# Patient Record
Sex: FEMALE | Race: WHITE | NOT HISPANIC OR LATINO | Employment: OTHER | URBAN - METROPOLITAN AREA
[De-identification: names, ages, dates, MRNs, and addresses within clinical notes are randomized per-mention and may not be internally consistent; named-entity substitution may affect disease eponyms.]

---

## 2019-02-12 ENCOUNTER — HOSPITAL ENCOUNTER (OUTPATIENT)
Dept: RADIOLOGY | Facility: HOSPITAL | Age: 69
Discharge: HOME | End: 2019-02-12
Attending: INTERNAL MEDICINE
Payer: MEDICARE

## 2019-02-12 ENCOUNTER — OFFICE VISIT (OUTPATIENT)
Dept: INTERNAL MEDICINE | Facility: CLINIC | Age: 69
End: 2019-02-12
Payer: MEDICARE

## 2019-02-12 VITALS
OXYGEN SATURATION: 97 % | HEIGHT: 69 IN | HEART RATE: 68 BPM | BODY MASS INDEX: 19.03 KG/M2 | WEIGHT: 128.5 LBS | DIASTOLIC BLOOD PRESSURE: 96 MMHG | SYSTOLIC BLOOD PRESSURE: 142 MMHG

## 2019-02-12 DIAGNOSIS — I10 ESSENTIAL HYPERTENSION: ICD-10-CM

## 2019-02-12 DIAGNOSIS — M19.041 PRIMARY OSTEOARTHRITIS OF BOTH HANDS: ICD-10-CM

## 2019-02-12 DIAGNOSIS — Z00.00 ANNUAL PHYSICAL EXAM: ICD-10-CM

## 2019-02-12 DIAGNOSIS — M19.042 PRIMARY OSTEOARTHRITIS OF BOTH HANDS: ICD-10-CM

## 2019-02-12 DIAGNOSIS — Z23 NEEDS FLU SHOT: ICD-10-CM

## 2019-02-12 DIAGNOSIS — R09.89 LABILE BLOOD PRESSURE: ICD-10-CM

## 2019-02-12 DIAGNOSIS — Z23 NEED FOR SHINGLES VACCINE: ICD-10-CM

## 2019-02-12 DIAGNOSIS — Z80.3 FAMILY HISTORY OF BREAST CANCER: ICD-10-CM

## 2019-02-12 DIAGNOSIS — Z23 NEED FOR DIPHTHERIA-TETANUS-PERTUSSIS (TDAP) VACCINE: ICD-10-CM

## 2019-02-12 DIAGNOSIS — G43.109 MIGRAINE WITH AURA AND WITHOUT STATUS MIGRAINOSUS, NOT INTRACTABLE: ICD-10-CM

## 2019-02-12 DIAGNOSIS — Z23 NEED FOR VACCINATION AGAINST STREPTOCOCCUS PNEUMONIAE USING PNEUMOCOCCAL CONJUGATE VACCINE 13: ICD-10-CM

## 2019-02-12 DIAGNOSIS — Z00.00 ANNUAL PHYSICAL EXAM: Primary | ICD-10-CM

## 2019-02-12 PROCEDURE — 99999 PR PBB SHADOW E&M-NEW PATIENT-LVL IV: ICD-10-PCS | Mod: PBBFAC,,, | Performed by: INTERNAL MEDICINE

## 2019-02-12 PROCEDURE — 71046 X-RAY EXAM CHEST 2 VIEWS: CPT | Mod: 26,,, | Performed by: RADIOLOGY

## 2019-02-12 PROCEDURE — 71046 X-RAY EXAM CHEST 2 VIEWS: CPT | Mod: TC

## 2019-02-12 PROCEDURE — 99387 PR PREVENTIVE VISIT,NEW,65 & OVER: ICD-10-PCS | Mod: S$PBB,,, | Performed by: INTERNAL MEDICINE

## 2019-02-12 PROCEDURE — 99387 INIT PM E/M NEW PAT 65+ YRS: CPT | Mod: S$PBB,,, | Performed by: INTERNAL MEDICINE

## 2019-02-12 PROCEDURE — 99999 PR PBB SHADOW E&M-NEW PATIENT-LVL IV: CPT | Mod: PBBFAC,,, | Performed by: INTERNAL MEDICINE

## 2019-02-12 PROCEDURE — 99204 OFFICE O/P NEW MOD 45 MIN: CPT | Mod: PBBFAC,25 | Performed by: INTERNAL MEDICINE

## 2019-02-12 PROCEDURE — 71046 XR CHEST PA AND LATERAL: ICD-10-PCS | Mod: 26,,, | Performed by: RADIOLOGY

## 2019-02-12 RX ORDER — LOSARTAN POTASSIUM 50 MG/1
50 TABLET ORAL DAILY
Qty: 90 TABLET | Refills: 3 | Status: SHIPPED | OUTPATIENT
Start: 2019-02-12 | End: 2020-02-06

## 2019-02-12 NOTE — PATIENT INSTRUCTIONS
Eating Heart-Healthy Food: Using the DASH Plan    Eating for your heart doesnt have to be hard or boring. You just need to know how to make healthier choices. The DASH eating plan has been developed to help you do just that. DASH stands for Dietary Approaches to Stop Hypertension. It is a plan that has been proven to be healthier for your heart and to lower your risk for high blood pressure. It can also help lower your risk for cancer, heart disease, osteoporosis, and diabetes.  Choosing from each food group  Choose foods from each of the food groups below each day. Try to get the recommended number of servings for each food group. The serving numbers are based on a diet of 2,000 calories a day. Talk to your doctor if youre unsure about your calorie needs. Along with getting the correct servings, the DASH plan also recommends a sodium intake less than 2,300 mg per day.        Grains  Servings: 6 to 8 a day  A serving is:  · 1 slice bread  · 1 ounce dry cereal  · Half a cup cooked rice, pasta or cereal  Best choices: Whole grains and any grains high in fiber. Vegetables  Servings: 4 to 5 a day  A serving is:  · 1 cup raw leafy vegetable  · Half a cup cut-up raw or cooked vegetable  · Half a cup vegetable juice  Best choices: Fresh or frozen vegetables prepared without added salt or fat.   Fruits  Servings: 4 to 5 a day  A serving is:  · 1 medium fruit  · One-quarter cup dried fruit  · Half a cup fresh, frozen, or canned fruit  · Half a cup of 100% fruit juices  Best choices: A variety of fresh fruits of different colors. Whole fruits are a better choice than fruit juices. Low-fat or fat-free dairy  Servings: 2 to 3 a day  A serving is:  · 1 cup milk  · 1 cup yogurt  · One and a half ounces cheese  Best choices: Skim or 1% milk, low-fat or fat-free yogurt or buttermilk, and low-fat cheeses.         Lean meats, poultry, fish  Servings: 6 or fewer a day  A serving is:  · 1 ounce cooked meats, poultry, or fish  · 1  egg  Best choices: Lean poultry and fish. Trim away visible fat. Broil, grill, roast, or boil instead of frying. Remove skin from poultry before eating. Limit how much red meat you eat.  Nuts, seeds, beans  Servings: 4 to 5 a week  A serving is:  · One-third cup nuts (one and a half ounces)  · 2 tablespoons nut butter or seeds  · Half a cup cooked dry beans or legumes  Best choices: Dry roasted nuts with no salt added, lentils, kidney beans, garbanzo beans, and whole moseley beans.   Fats and oils  Servings: 2 to 3 a day  A serving is:  · 1 teaspoon vegetable oil  · 1 teaspoon soft margarine  · 1 tablespoon mayonnaise  · 2 tablespoons salad dressing  Best choices: Nut and vegetable oils (nontropical vegetable oils), such as olive and canola oil. Sweets  Servings: 5 a week or fewer  A serving is:  · 1 tablespoon sugar, maple syrup, or honey  · 1 tablespoon jam or jelly  · 1 half-ounce jelly beans (about 15)  · 1 cup lemonade  Best choices: Dried fruit can be a satisfying sweet. Choose low-fat sweets. And watch your serving sizes!      For more on the DASH eating plan, visit:  www.nhlbi.nih.gov/health/health-topics/topics/dash   Date Last Reviewed: 6/1/2016  © 8824-8993 [employer]. [address] [place] [address], [place] [address]. All rights reserved. This information is not intended as a substitute for professional medical care. Always follow your healthcare professional's instructions.        Tips for Using Less Salt    Most people with heart problems need to eat less salt (sodium). Reducing the amount of salt you eat may help control your blood pressure. The higher your blood pressure, the greater your risk for heart disease, stroke, blindness, and kidney problems.  At the store  · Make low-salt choices by reading labels carefully. Look for the total amount of sodium per serving.  · Use more fresh food. Buy more fruits and vegetables. Select lean meats, fish, and poultry.  · Use fewer frozen, canned, and  packaged foods which often contain a lot of sodium.  · Use plain frozen vegetables without sauces or toppings. These products are often low- or no-sodium.  · Opt for reduced-sodium or no-salt-added versions of canned vegetables and soups.  In the kitchen  · Don't add salt to food when you're cooking. Season with flavorings such as onion, garlic, pepper, salt-free herbal blends, and lemon or lime juice.  · Use a cookbook containing low-salt recipes. It can give you ideas for tasty meals that are healthy for your heart.  · Sprinkle salt-free herbal blends on vegetables and meat.  · Drain and rinse canned foods, such as canned beans and vegetables, before cooking or eating.  Eating out  · Tell the  you're on a low-salt diet. Ask questions about the menu.  · Order fish, chicken, and meat broiled, baked, poached, or grilled without salt, butter, or breading.  · Use lemon, pepper, and salt-free herb mixes to add flavor.  · Choose plain steamed rice, boiled noodles, and baked or boiled potatoes. Top potatoes with chives and a little sour cream.     Beware! Salt goes by many other names. Limit foods with these words listed as ingredients: salt, sodium, soy sauce, baking soda, baking powder, MSG, monosodium, Na (the chemical symbol for sodium). Some antacids are also high in salt.   Date Last Reviewed: 6/19/2015 © 2000-2017 [employer]. [address] [place] [address]. All rights reserved. This information is not intended as a substitute for professional medical care. Always follow your healthcare professional's instructions.        Low-Salt Choices  Eating salt (sodium) can make your body retain too much water. Excess water makes your heart work harder. Canned, packaged, and frozen foods are easy to prepare, but they are often high in sodium. Here are some ideas for low-salt foods you can easily prepare yourself.    For breakfast  · Fruit or 100% fruit juice  · Whole-wheat bread or an English  muffin. Compare sodium content on labels.  · Low-fat milk or yogurt  · Unsalted eggs  · Shredded wheat  · Corn tortillas  · Unsalted steamed rice  · Regular (not instant) hot cereal, made without salt  Stay away from:  · Sausage, [place], and ham  · Flour tortillas  · Packaged muffins, pancakes, and biscuits  · Instant hot cereals  · Cottage cheese  For lunch and dinner  · Fresh fish, chicken, turkey, or meat--baked, broiled, or roasted without salt  · Dry beans, cooked without salt  · Tofu, stir-fried without salt  · Unsalted fresh fruit and vegetables, or frozen or canned fruit and vegetables with no added salt  Stay away from:  · Lunch or deli meat that is cured or smoked  · Cheese  · Tomato juice and catsup  · Canned vegetables, soups, and fish not labeled as no-salt-added or reduced sodium  · Packaged gravies and sauces  · Olives, pickles, and relish  · Bottled salad dressings  For snacks and desserts  · Yogurt  · Unsalted, air popped popcorn  · Unsalted nuts or seeds  Stay away from:  · Pies and cakes  · Packaged dessert mixes  · Pizza  · Canned and packaged puddings  · Pretzels, chips, crackers, and nuts--unless the label says unsalted  Date Last Reviewed: 6/17/2015  © 2243-6743 [employer]. [address] [place] [address], [place] [address]. All rights reserved. This information is not intended as a substitute for professional medical care. Always follow your healthcare professional's instructions.      Goal  or less on top and 80 or less on the bottom

## 2019-02-14 ENCOUNTER — HOSPITAL ENCOUNTER (OUTPATIENT)
Dept: CARDIOLOGY | Facility: CLINIC | Age: 69
Discharge: HOME | End: 2019-02-14
Payer: MEDICARE

## 2019-02-14 DIAGNOSIS — Z00.00 ANNUAL PHYSICAL EXAM: ICD-10-CM

## 2019-02-14 DIAGNOSIS — I10 ESSENTIAL HYPERTENSION: ICD-10-CM

## 2019-02-14 PROCEDURE — 93000 EKG 12-LEAD: ICD-10-PCS | Mod: S$GLB,,, | Performed by: INTERNAL MEDICINE

## 2019-02-14 PROCEDURE — 93000 ELECTROCARDIOGRAM COMPLETE: CPT | Mod: S$GLB,,, | Performed by: INTERNAL MEDICINE

## 2019-02-14 PROCEDURE — 93005 ELECTROCARDIOGRAM TRACING: CPT | Mod: PBBFAC | Performed by: INTERNAL MEDICINE

## 2019-02-14 NOTE — PROGRESS NOTES
Subjective:       Patient ID: Stephanie Perez is a 68 y.o. female.    Chief Complaint: Annual Exam   Wellness visit    She is a very pleasant woman.  She is active but has not been playing as much tennis lately.  She watches her weight carefully and typically only eats once daily, a large salad usually.    She has never had an internists.  For the past couple of years her gynecologist has asked her to establish with an internist because her blood pressure has been running consistently high.  At home she is often getting about 150 over 95.  HPI  Review of Systems    Objective:      Physical Exam    Assessment:       1. Annual physical exam    2. Essential hypertension    3. Primary osteoarthritis of both hands    4. Migraine with aura and without status migrainosus, not intractable    5. Family history of breast cancer, one sister, paternal grandmother    6. Need for diphtheria-tetanus-pertussis (Tdap) vaccine    7. Needs flu shot    8. Need for vaccination against Streptococcus pneumoniae using pneumococcal conjugate vaccine 13    9. Need for shingles vaccine        Plan:   Stephanie was seen today for annual exam.    Diagnoses and all orders for this visit:    Annual physical exam  -     EKG 12-lead; Future  -     X-Ray Chest PA And Lateral; Future  -     CBC auto differential; Future  -     Comprehensive metabolic panel; Future  -     Urinalysis  -     Uric acid; Future  -     Lipid panel; Future    Essential hypertension.  Goal blood pressure is 130/80 or less consistently.  Dash diet recommended.  Reading labels and introducing low salt foods.  -     EKG 12-lead; Future  -     X-Ray Chest PA And Lateral; Future  -     CBC auto differential; Future  -     Comprehensive metabolic panel; Future  -     Urinalysis  -     Uric acid; Future  -     Lipid panel; Future    Primary osteoarthritis of both hands    Migraine with aura and without status migrainosus, not intractable    Family history of breast cancer, one sister,  paternal grandmother    Need for diphtheria-tetanus-pertussis (Tdap) vaccine    Needs flu shot    Need for vaccination against Streptococcus pneumoniae using pneumococcal conjugate vaccine 13    Need for shingles vaccine    Other orders  -     losartan (COZAAR) 50 MG tablet; Take 1 tablet (50 mg total) by mouth once daily. Blood pressure    Follow-up in about 3 months (around 5/12/2019) for also one year physical.      Addendum:  08-  This visit is being recoded for labile blood pressure.  No history of hypertension.

## 2019-05-23 ENCOUNTER — OFFICE VISIT (OUTPATIENT)
Dept: INTERNAL MEDICINE | Facility: CLINIC | Age: 69
End: 2019-05-23
Payer: MEDICARE

## 2019-05-23 VITALS
SYSTOLIC BLOOD PRESSURE: 164 MMHG | HEART RATE: 61 BPM | HEIGHT: 69 IN | OXYGEN SATURATION: 99 % | WEIGHT: 128.06 LBS | BODY MASS INDEX: 18.97 KG/M2 | DIASTOLIC BLOOD PRESSURE: 90 MMHG

## 2019-05-23 DIAGNOSIS — Z12.11 SCREEN FOR COLON CANCER: ICD-10-CM

## 2019-05-23 DIAGNOSIS — I10 ESSENTIAL HYPERTENSION: Primary | ICD-10-CM

## 2019-05-23 PROCEDURE — 99213 PR OFFICE/OUTPT VISIT, EST, LEVL III, 20-29 MIN: ICD-10-PCS | Mod: S$PBB,,, | Performed by: INTERNAL MEDICINE

## 2019-05-23 PROCEDURE — 99999 PR PBB SHADOW E&M-EST. PATIENT-LVL IV: ICD-10-PCS | Mod: PBBFAC,,, | Performed by: INTERNAL MEDICINE

## 2019-05-23 PROCEDURE — 99999 PR PBB SHADOW E&M-EST. PATIENT-LVL IV: CPT | Mod: PBBFAC,,, | Performed by: INTERNAL MEDICINE

## 2019-05-23 PROCEDURE — 99214 OFFICE O/P EST MOD 30 MIN: CPT | Mod: PBBFAC | Performed by: INTERNAL MEDICINE

## 2019-05-23 PROCEDURE — 99213 OFFICE O/P EST LOW 20 MIN: CPT | Mod: S$PBB,,, | Performed by: INTERNAL MEDICINE

## 2019-05-23 RX ORDER — ESTRADIOL 0.5 MG/1
0.5 TABLET ORAL DAILY
Refills: 4 | COMMUNITY
Start: 2019-03-26 | End: 2023-01-18

## 2019-05-23 RX ORDER — MEDROXYPROGESTERONE ACETATE 2.5 MG/1
2.5 TABLET ORAL DAILY
Refills: 3 | COMMUNITY
Start: 2019-03-23 | End: 2023-01-18

## 2019-05-23 NOTE — PROGRESS NOTES
Subjective:       Patient ID: Stephanie Perez is a 69 y.o. female.    Chief Complaint: Hypertension (f/u) and Follow-up (3 month )   Follow-up treatment of essential hypertension newly diagnosed and preop cataract surgery with Dr. Javier Briceno.    The patient is ready for cataract surgery and looking forward to this.  She is clear for surgery.    Her blood pressures at home have been running between about 135 and 145 systolic over 70-75.    She has no symptoms of hypertension.    Her previous laboratory work, chest x-ray and EKG are reviewed and are normal.    Screenings and immunizations are discussed.  She will consider doing a colonoscopy.  She has never had a colonoscopy.  Her   of colon cancer.  HPI  Review of Systems   Constitutional: Negative for activity change and unexpected weight change.   HENT: Negative for hearing loss, rhinorrhea and trouble swallowing.    Eyes: Negative for discharge and visual disturbance.   Respiratory: Negative for chest tightness and wheezing.    Cardiovascular: Negative for chest pain and palpitations.   Gastrointestinal: Negative for blood in stool, constipation, diarrhea and vomiting.   Endocrine: Negative for polydipsia and polyuria.   Genitourinary: Negative for difficulty urinating, dysuria, hematuria and menstrual problem.   Musculoskeletal: Negative for arthralgias, joint swelling and neck pain.   Neurological: Negative for weakness and headaches.   Psychiatric/Behavioral: Negative for confusion and dysphoric mood.       Objective:      Physical Exam   Constitutional: She appears well-nourished.   HENT:   Head: Atraumatic.   Eyes: Conjunctivae are normal. No scleral icterus.   Neck: Neck supple.   Cardiovascular: Normal rate and regular rhythm.   Pulmonary/Chest: Effort normal and breath sounds normal.   Abdominal: Soft. There is no tenderness.   Musculoskeletal: She exhibits no edema.   Lymphadenopathy:     She has no cervical adenopathy.   Neurological: She is  alert.   Skin: Skin is warm and dry.   Psychiatric: She has a normal mood and affect. Her behavior is normal.   Vitals reviewed.      Assessment:       1. Essential hypertension    2. Screen for colon cancer        Plan:   Stephanie was seen today for hypertension and follow-up.    Diagnoses and all orders for this visit:    Essential hypertension    Screen for colon cancer  -     Case request GI: COLONOSCOPY  Medication List with Changes/Refills   Current Medications    ESTRADIOL (ESTRACE) 0.5 MG TABLET    Take 0.5 mg by mouth once daily.    LOSARTAN (COZAAR) 50 MG TABLET    Take 1 tablet (50 mg total) by mouth once daily. Blood pressure    MEDROXYPROGESTERONE (PROVERA) 2.5 MG TABLET    Take 2.5 mg by mouth once daily.       Follow up in about 3 months (around 8/23/2019).

## 2019-08-07 ENCOUNTER — OFFICE VISIT (OUTPATIENT)
Dept: INTERNAL MEDICINE | Facility: CLINIC | Age: 69
End: 2019-08-07
Payer: MEDICARE

## 2019-08-07 VITALS
WEIGHT: 128.31 LBS | HEIGHT: 68 IN | OXYGEN SATURATION: 97 % | HEART RATE: 102 BPM | SYSTOLIC BLOOD PRESSURE: 126 MMHG | BODY MASS INDEX: 19.45 KG/M2 | DIASTOLIC BLOOD PRESSURE: 82 MMHG

## 2019-08-07 DIAGNOSIS — I10 ESSENTIAL HYPERTENSION: Primary | ICD-10-CM

## 2019-08-07 PROBLEM — R09.89 LABILE BLOOD PRESSURE: Status: ACTIVE | Noted: 2019-08-07

## 2019-08-07 PROCEDURE — 99999 PR PBB SHADOW E&M-EST. PATIENT-LVL III: CPT | Mod: PBBFAC,,, | Performed by: INTERNAL MEDICINE

## 2019-08-07 PROCEDURE — 99999 PR PBB SHADOW E&M-EST. PATIENT-LVL III: ICD-10-PCS | Mod: PBBFAC,,, | Performed by: INTERNAL MEDICINE

## 2019-08-07 PROCEDURE — 99213 OFFICE O/P EST LOW 20 MIN: CPT | Mod: S$PBB,,, | Performed by: INTERNAL MEDICINE

## 2019-08-07 PROCEDURE — 99213 OFFICE O/P EST LOW 20 MIN: CPT | Mod: PBBFAC | Performed by: INTERNAL MEDICINE

## 2019-08-07 PROCEDURE — 99213 PR OFFICE/OUTPT VISIT, EST, LEVL III, 20-29 MIN: ICD-10-PCS | Mod: S$PBB,,, | Performed by: INTERNAL MEDICINE

## 2019-08-09 NOTE — PROGRESS NOTES
Subjective:       Patient ID: Stephanie Perez is a 69 y.o. female.    Chief Complaint: Follow-up (BP f/u )   Follow-up blood pressure  This nice lady's doing very well  She brings a record of her blood pressures  HPI  Review of Systems   Respiratory: Negative for shortness of breath.    Cardiovascular: Negative for chest pain and palpitations.   Musculoskeletal: Negative for neck pain.   Neurological: Negative for headaches.       Objective:      Physical Exam   Constitutional: She appears well-nourished.   HENT:   Head: Atraumatic.   Eyes: Conjunctivae are normal. No scleral icterus.   Neck: Neck supple.   Cardiovascular: Normal rate and regular rhythm.   Pulmonary/Chest: Effort normal and breath sounds normal.   Abdominal: Soft. There is no tenderness.   Musculoskeletal: She exhibits no edema.   Lymphadenopathy:     She has no cervical adenopathy.   Neurological: She is alert.   Skin: Skin is warm and dry.   Psychiatric: She has a normal mood and affect. Her behavior is normal.       Assessment:       1. Essential hypertension        Plan:   Stephanie was seen today for follow-up.    Diagnoses and all orders for this visit:    Essential hypertension  Medication List with Changes/Refills   Current Medications    ESTRADIOL (ESTRACE) 0.5 MG TABLET    Take 0.5 mg by mouth once daily.    LOSARTAN (COZAAR) 50 MG TABLET    Take 1 tablet (50 mg total) by mouth once daily. Blood pressure    MEDROXYPROGESTERONE (PROVERA) 2.5 MG TABLET    Take 2.5 mg by mouth once daily.     She is going to continue to monitor her blood pressure, her diet and follow-up.

## 2019-11-18 DIAGNOSIS — Z12.11 COLON CANCER SCREENING: ICD-10-CM

## 2020-02-06 PROBLEM — I10 ESSENTIAL HYPERTENSION: Status: ACTIVE | Noted: 2020-02-06

## 2020-02-06 PROBLEM — E78.5 HYPERLIPIDEMIA: Status: ACTIVE | Noted: 2020-02-06

## 2020-02-13 ENCOUNTER — LAB VISIT (OUTPATIENT)
Dept: LAB | Facility: HOSPITAL | Age: 70
End: 2020-02-13
Attending: INTERNAL MEDICINE
Payer: COMMERCIAL

## 2020-02-13 DIAGNOSIS — Z12.11 COLON CANCER SCREENING: ICD-10-CM

## 2020-02-13 PROCEDURE — 82274 ASSAY TEST FOR BLOOD FECAL: CPT

## 2020-02-19 ENCOUNTER — PATIENT OUTREACH (OUTPATIENT)
Dept: ADMINISTRATIVE | Facility: HOSPITAL | Age: 70
End: 2020-02-19

## 2020-02-19 LAB — HEMOCCULT STL QL IA: NEGATIVE

## 2020-04-23 ENCOUNTER — PATIENT MESSAGE (OUTPATIENT)
Dept: ADMINISTRATIVE | Facility: OTHER | Age: 70
End: 2020-04-23

## 2020-05-07 RX ORDER — LOSARTAN POTASSIUM 50 MG/1
50 TABLET ORAL DAILY
Qty: 90 TABLET | Refills: 0 | Status: SHIPPED | OUTPATIENT
Start: 2020-05-07 | End: 2020-06-11 | Stop reason: SDUPTHER

## 2020-05-07 NOTE — PROGRESS NOTES
Refill Authorization Note    is requesting a refill authorization.    Brief assessment and rationale for refill: APPROVE: prr          Medication Therapy Plan: FLOS; FOVS; Approve 3 more    Medication reconciliation completed: No                         Comments:      Requested Prescriptions   Signed Prescriptions Disp Refills    losartan (COZAAR) 50 MG tablet 90 tablet 0     Sig: Take 1 tablet (50 mg total) by mouth once daily. Blood pressure       Cardiovascular:  Angiotensin Receptor Blockers Failed - 5/7/2020  3:50 PM        Failed - Cr is 1.3 or below and within 360 days     Creatinine   Date Value Ref Range Status   02/12/2019 0.7 0.5 - 1.4 mg/dL Final              Failed - K in normal range and within 360 days     Potassium   Date Value Ref Range Status   02/12/2019 4.2 3.5 - 5.1 mmol/L Final              Failed - eGFR within 360 days     eGFR if non    Date Value Ref Range Status   02/12/2019 >60.0 >60 mL/min/1.73 m^2 Final     Comment:     Calculation used to obtain the estimated glomerular filtration  rate (eGFR) is the CKD-EPI equation.        eGFR if    Date Value Ref Range Status   02/12/2019 >60.0 >60 mL/min/1.73 m^2 Final              Passed - Patient is at least 18 years old        Passed - Last BP in normal range within 360 days.     BP Readings from Last 3 Encounters:   08/07/19 126/82   05/23/19 (!) 164/90   02/12/19 (!) 142/96              Passed - Office visit in past 12 months or future 90 days.     Recent Outpatient Visits            9 months ago Essential hypertension    [facility] Danita Cornell MD    11 months ago Essential hypertension    [facility] Dan[facility] Cornell MD    1 year ago Annual physical exam    [facility] Danita Cornell MD          Future Appointments              In 4 weeks LAB, APPOINTMENT [facility], [facility]    In 1 month Danita SALOMON  MD [facility]                 Appointments  past 12m or future 3m with PCP    Date Provider   Last Visit   8/7/2019 Danita Cornell MD   Next Visit   6/11/2020 Danita Cornell MD   ED visits in past 90 days: 0     Note composed:4:14 PM 05/07/2020

## 2020-05-27 ENCOUNTER — PATIENT OUTREACH (OUTPATIENT)
Dept: OTHER | Facility: OTHER | Age: 70
End: 2020-05-27

## 2020-05-27 DIAGNOSIS — I10 ESSENTIAL HYPERTENSION: Primary | ICD-10-CM

## 2020-05-28 ENCOUNTER — PATIENT OUTREACH (OUTPATIENT)
Dept: OTHER | Facility: OTHER | Age: 70
End: 2020-05-28

## 2020-05-28 NOTE — LETTER
Barbara 3, 2020     Stephanie Perez  [address] [address]       Dear Stephanie,    Welcome to [facility]! Our goal is to make care effective, proactive and convenient by using data you send us from home to better treat your chronic conditions.              My name is Cindy DaoNamrtaa, and I am your dedicated Digital Medicine clinician. As an expert in medication management, I will help ensure that the medications you are taking continue to provide the intended benefits and help you reach your goals. You can reach me directly at [phone] or by sending me a message directly through your [facility] account.      I am Nya Espino and I will be your health . My job is to help you identify lifestyle changes to improve your disease control. We will talk about nutrition, exercise, and other ways you may be able to adjust your current habits to better your health. Additionally, we will help ensure you are completing the tests and screenings that are necessary to help manage your conditions. You can reach me directly at [phone] or by sending me a message directly through your [facility] account.    Most importantly, YOU are at the center of this team. Together, we will work to improve your overall health and encourage you to meet your goals for a healthier lifestyle.     What we expect from YOU:  · Please take frequent home blood pressure measurements. We ask that you take at least 1 blood pressure reading per week, but more information will better help us get you know you. Be sure you rest for a few minutes before taking the reading in a quiet, comfortable place.     Be available to receive phone calls or happin!t messages, when appropriate, from your care team. Please let us know if there are any specific days or times that work best for us to reach you via phone.     Complete routine tests and screenings. Dont worry, we will help keep you on track!           What you  should expect from your Digital Medicine Care Team:   We will work with you to create a personalized plan of care and provide you with encouragement and education, including regarding lifestyle changes, that could help you manage your disease states.     We will adjust your current medications, if needed, and continue to monitor your long-term progress.     We will provide you and your physician with monthly progress reports after you have been in the program for more than 30 days.     We will send you reminders through [email or web address] and text messages to help ensure you do not miss any testing deadlines to help manage your disease states.    You will be able to reach us by phone or through your [employer] account by clicking our names under Care Team on the right side of the home screen.    I look forward to working with you to achieve your blood pressure goals!    We look forward to working with you to help manage your health,    Sincerely,    Your Digital Medicine Team    Please visit our websites to learn more:   · Hypertension: [email or web address]      Remember, we are not available for emergencies. If you have an emergency, please contact your doctors office directly or call [facility] ([phone] or [phone]) or [phone].

## 2020-06-03 NOTE — PROGRESS NOTES
Mrs. Robertson called me, and we completed the program enrollment call. Initial high BP readings received were inaccurate. She denies hypertensive symptoms. She checked her blood pressure on her personal cuff after seeing those readings and they were much lower. Patient has been taking readings before medication; she will switch up the timing and take a reading 1 hour post medication. I will let her clinician know about false high readings. She is seeing Dr. Marquez sometime next week.         HYPERTENSION  Our goal is to get BP to consistently below 130/80mmHg and make the process convenient so patient can avoid extra trips to the office. Getting your blood pressure below 130/80mmHg (definition of control) will reduce your risk for heart attack, kidney failure, stroke and death (as well as kidney failure, eye disease, & dementia)      Reviewed that the Digital Medicine care team - consisting of a clinician and a health  - will follow the most current evidence-based national guidelines for treating your condition.  The health  will focus on lifestyle modifications and motivation while the clinician will focus on medication therapy.  The care team will review all data on a regular basis and reach out as needed.      Explained that one of the key parts of the program is communication with the care team.  Asked patient to respond to outreach attempts and complete questionnaires.  Stressed importance of medication adherence.    Reviewed non-pharmacologic therapies and impact on BP.      Explained that we expect patient to obtain several blood pressures per week at random times of day.  Instructed patient not to allow anyone else to use phone and monitoring device.  Confirmed appropriate BP monitoring technique.      Explained to patient that the digital medicine team is not available for emergencies.  Patient will call [facility] ([phone] or [phone]) or [phone] if needed.      Patient's BP  goal is 130/80.Patient's BP average is 154/85 mmHg, which is above goal, per 2017 ACC/AHA Hypertension Guidelines.

## 2020-06-04 ENCOUNTER — LAB VISIT (OUTPATIENT)
Dept: LAB | Facility: HOSPITAL | Age: 70
End: 2020-06-04
Attending: INTERNAL MEDICINE
Payer: MEDICARE

## 2020-06-04 DIAGNOSIS — R09.89 LABILE BLOOD PRESSURE: ICD-10-CM

## 2020-06-04 DIAGNOSIS — E78.5 HYPERLIPIDEMIA, UNSPECIFIED HYPERLIPIDEMIA TYPE: ICD-10-CM

## 2020-06-04 DIAGNOSIS — I10 ESSENTIAL HYPERTENSION: ICD-10-CM

## 2020-06-04 LAB
ALBUMIN SERPL BCP-MCNC: 4.3 G/DL (ref 3.5–5.2)
ALP SERPL-CCNC: 67 U/L (ref 55–135)
ALT SERPL W/O P-5'-P-CCNC: 18 U/L (ref 10–44)
ANION GAP SERPL CALC-SCNC: 9 MMOL/L (ref 8–16)
AST SERPL-CCNC: 37 U/L (ref 10–40)
BILIRUB SERPL-MCNC: 0.8 MG/DL (ref 0.1–1)
BUN SERPL-MCNC: 21 MG/DL (ref 8–23)
CALCIUM SERPL-MCNC: 9.8 MG/DL (ref 8.7–10.5)
CHLORIDE SERPL-SCNC: 103 MMOL/L (ref 95–110)
CHOLEST SERPL-MCNC: 276 MG/DL (ref 120–199)
CHOLEST/HDLC SERPL: ABNORMAL {RATIO} (ref 2–5)
CO2 SERPL-SCNC: 28 MMOL/L (ref 23–29)
CREAT SERPL-MCNC: 0.8 MG/DL (ref 0.5–1.4)
EST. GFR  (AFRICAN AMERICAN): >60 ML/MIN/1.73 M^2
EST. GFR  (NON AFRICAN AMERICAN): >60 ML/MIN/1.73 M^2
GLUCOSE SERPL-MCNC: 101 MG/DL (ref 70–110)
HDLC SERPL-MCNC: >140 MG/DL (ref 40–75)
HDLC SERPL: ABNORMAL % (ref 20–50)
LDLC SERPL CALC-MCNC: ABNORMAL MG/DL (ref 63–159)
NONHDLC SERPL-MCNC: ABNORMAL MG/DL
POTASSIUM SERPL-SCNC: 4.3 MMOL/L (ref 3.5–5.1)
PROT SERPL-MCNC: 7.5 G/DL (ref 6–8.4)
SODIUM SERPL-SCNC: 140 MMOL/L (ref 136–145)
TRIGL SERPL-MCNC: 44 MG/DL (ref 30–150)
TSH SERPL DL<=0.005 MIU/L-ACNC: 1.09 UIU/ML (ref 0.4–4)

## 2020-06-04 PROCEDURE — 84443 ASSAY THYROID STIM HORMONE: CPT

## 2020-06-04 PROCEDURE — 80061 LIPID PANEL: CPT

## 2020-06-04 PROCEDURE — 36415 COLL VENOUS BLD VENIPUNCTURE: CPT

## 2020-06-04 PROCEDURE — 80053 COMPREHEN METABOLIC PANEL: CPT

## 2020-06-09 PROCEDURE — 99457 PR MONITORING, PHYSIOL PARAM, REMOTE, 1ST 20 MINS, PER MONTH: ICD-10-PCS | Mod: S$GLB,,, | Performed by: INTERNAL MEDICINE

## 2020-06-09 PROCEDURE — 99457 RPM TX MGMT 1ST 20 MIN: CPT | Mod: S$GLB,,, | Performed by: INTERNAL MEDICINE

## 2020-06-09 RX ORDER — MULTIVITAMIN
1 TABLET ORAL DAILY
COMMUNITY

## 2020-06-09 RX ORDER — LANOLIN ALCOHOL/MO/W.PET/CERES
400 CREAM (GRAM) TOPICAL DAILY
COMMUNITY

## 2020-06-09 RX ORDER — AMOXICILLIN 500 MG
2 CAPSULE ORAL DAILY
COMMUNITY

## 2020-06-09 NOTE — PROGRESS NOTES
06/09/2020 11:04 AM   Called patient, I was able to leave a voicemail. I will reach back out in 2 weeks.    Pt has upcoming appt with PCP on 6/11. CMP WNL. Recommend increasing to losartan 100 mg QD for BP control. Will f/u in 2 weeks.     Last 5 Patient Entered Readings                                      Current 30 Day Average: 154/84     Recent Readings 6/8/2020 6/5/2020 6/2/2020 6/1/2020 5/31/2020    SBP (mmHg) 158 148 151 152 157    DBP (mmHg) 68 89 81 79 88    Pulse 57 57 59 56 66

## 2020-06-09 NOTE — PROGRESS NOTES
Digital Medicine: Clinician Follow-Up    Called patient to follow up. Patient endorses adherence to medication regimen. Patient denies hypotensive s/sx (lightheadedness, dizziness, nausea, fatigue); patient denies hypertensive s/sx (SOB, CP, severe headaches, changes in vision). Instructed patient to seek medical care if BP > 180/110 and is accompanied by hypertensive s/sx associated, patient confirms understanding.     Reviewed BP monitoring technique with pt, she reports that she is getting more comfortable with the cuff and BP readings are stabilizing. She has a PCP appt on 6/11 and she is open to increasing meds at [facility] ttime.     After speaking with HC and reading various articles, she has switched admin time of losartan to night and takes BP readings in the AM. I educated pt on PK of losartan, and asked she be consistent with admin times whether it is AM or PM. Pt verbally agreed. We also discussed when to take BP.       Patient denies having questions or concerns. Patient has my contact information and knows to call with any concerns or clinical changes.        The history is provided by the patient. No  was used.     HYPERTENSION  Our goal is to get BP to consistently below 130/80mmHg and make the process convenient so patient can avoid extra trips to the office. Getting your blood pressure below 130/80mmHg (definition of control) will reduce your risk for heart attack, kidney failure, stroke and death (as well as kidney failure, eye disease, & dementia)      Reviewed non-pharmacologic therapies and impact on BP      Explained that we expect patient to obtain several blood pressures per week at random times of day.  Instructed patient not to allow anyone else to use phone and monitoring device.  Confirmed appropriate BP monitoring technique.      Explained to patient that the digital medicine team is not available for emergencies.  Patient will call [facility] on-call ([phone] or  [phone]) or [phone] if needed.    Patient's BP goal is 130/80. Patients BP average is 154/84 mmHg, which is above goal, per 2017 ACC/AHA Hypertension Guidelines.        Assessment:  Reviewed recent readings. Per 2017 ACC/ AHA HTN guidelines (goal of BP < 130/80), current 30-day average needs to be addressed more thoroughly today. Pt amenable to increasing ARB therapy.        Med Review complete.    Allergies reviewed.      INTERVENTION(S)  reviewed appropriate dose schedule, reviewed monitoring technique, encouragement/support and goal setting    PLAN  patient verbalizes understanding, patient amenable to changes and continue monitoring    Continue current medication regimen at this time until PCP appt. Recommend increasing losartan to 100 mg QD. I will continue to monitor regularly and will follow-up in 2 to 3 weeks, sooner if blood pressure begins to trend upward or downward.         There are no preventive care reminders to display for this patient.    Last 5 Patient Entered Readings                                      Current 30 Day Average: 154/84     Recent Readings 6/8/2020 6/5/2020 6/2/2020 6/1/2020 5/31/2020    SBP (mmHg) 158 148 151 152 157    DBP (mmHg) 68 89 81 79 88    Pulse 57 57 59 56 66             Hypertension Medications             losartan (COZAAR) 50 MG tablet Take 1 tablet (50 mg total) by mouth once daily. Blood pressure                             Medication Adherence Screening   She did not miss a dose this month.  Patient knows purpose of medications.      Patient identified the following reasons for non-compliance: None

## 2020-06-11 ENCOUNTER — OFFICE VISIT (OUTPATIENT)
Dept: INTERNAL MEDICINE | Facility: CLINIC | Age: 70
End: 2020-06-11
Payer: MEDICARE

## 2020-06-11 VITALS
DIASTOLIC BLOOD PRESSURE: 80 MMHG | WEIGHT: 129.88 LBS | SYSTOLIC BLOOD PRESSURE: 132 MMHG | OXYGEN SATURATION: 96 % | HEART RATE: 76 BPM | BODY MASS INDEX: 19.24 KG/M2 | HEIGHT: 69 IN

## 2020-06-11 DIAGNOSIS — Z12.11 SCREEN FOR COLON CANCER: ICD-10-CM

## 2020-06-11 DIAGNOSIS — M19.041 PRIMARY OSTEOARTHRITIS OF BOTH HANDS: ICD-10-CM

## 2020-06-11 DIAGNOSIS — M19.042 PRIMARY OSTEOARTHRITIS OF BOTH HANDS: ICD-10-CM

## 2020-06-11 DIAGNOSIS — Z09 NEED FOR IMMUNIZATION FOLLOW-UP: ICD-10-CM

## 2020-06-11 DIAGNOSIS — I10 ESSENTIAL HYPERTENSION: ICD-10-CM

## 2020-06-11 DIAGNOSIS — Z00.00 ANNUAL PHYSICAL EXAM: Primary | ICD-10-CM

## 2020-06-11 DIAGNOSIS — H61.21 IMPACTED CERUMEN OF RIGHT EAR: ICD-10-CM

## 2020-06-11 PROCEDURE — 99397 PER PM REEVAL EST PAT 65+ YR: CPT | Mod: S$PBB,,, | Performed by: INTERNAL MEDICINE

## 2020-06-11 PROCEDURE — 99999 PR PBB SHADOW E&M-EST. PATIENT-LVL IV: ICD-10-PCS | Mod: PBBFAC,,, | Performed by: INTERNAL MEDICINE

## 2020-06-11 PROCEDURE — 99214 OFFICE O/P EST MOD 30 MIN: CPT | Mod: PBBFAC | Performed by: INTERNAL MEDICINE

## 2020-06-11 PROCEDURE — 99397 PR PREVENTIVE VISIT,EST,65 & OVER: ICD-10-PCS | Mod: S$PBB,,, | Performed by: INTERNAL MEDICINE

## 2020-06-11 PROCEDURE — 99999 PR PBB SHADOW E&M-EST. PATIENT-LVL IV: CPT | Mod: PBBFAC,,, | Performed by: INTERNAL MEDICINE

## 2020-06-11 RX ORDER — LOSARTAN POTASSIUM 100 MG/1
100 TABLET ORAL DAILY
Qty: 90 TABLET | Refills: 3 | Status: SHIPPED | OUTPATIENT
Start: 2020-06-11 | End: 2021-03-18 | Stop reason: ALTCHOICE

## 2020-06-11 NOTE — PROGRESS NOTES
Subjective:       Patient ID: Stephanie Perez is a 70 y.o. female.    Chief Complaint: Annual Exam  This dictation was performed using using MModal.    The patient presents to the office today for a routine annual physical.  She says overall she feels healthy and does not have any complaints, except that her ears are bothering her and she thinks she might have wax buildup again and she has hand arthritis.    She is in the digital medicine hypertension program and monitoring her blood pressure at home.  She began taking losartan 50 mg once daily in February, 2020.  Now,  while taking losartan 50 mg tablet once daily her blood pressure is running most often about 155 systolic.    We discussed the importance routine immunizations especially taking Prevnar 13 to prevent pneumonia and 6 months to 1 year later taking Pneumovax 23.  The flu shot when it becomes available.  Once every 10 years tetanus diphtheria whooping cough.  And she is educated about the conjugate shingles vaccine, 2 shot series.  She seems disinclined to take any immunizations.  She is given a card, listing the routine immunizations recommended for all adults.    She has never had a colonoscopy and declines colonoscopy.  She does agree to do a fit kit. iFOBT negative February 19, 2020.  HPI  Review of Systems   Constitutional: Negative for activity change, appetite change, chills, fatigue, fever and unexpected weight change.   HENT: Positive for ear pain. Negative for dental problem, hearing loss, tinnitus, trouble swallowing and voice change.    Eyes: Negative for visual disturbance.   Respiratory: Negative for cough, chest tightness, shortness of breath and wheezing.    Cardiovascular: Negative for chest pain, palpitations and leg swelling.   Gastrointestinal: Negative for abdominal pain, blood in stool, constipation, diarrhea and nausea.   Genitourinary: Negative for difficulty urinating, dysuria, frequency and urgency.   Musculoskeletal: Positive for  arthralgias. Negative for back pain, gait problem, joint swelling, myalgias, neck pain and neck stiffness.   Skin: Negative for rash.   Neurological: Negative for dizziness, tremors, speech difficulty, weakness, light-headedness and headaches.   Psychiatric/Behavioral: Negative for dysphoric mood and sleep disturbance. The patient is not nervous/anxious.        Objective:      Physical Exam   Constitutional: She is oriented to person, place, and time. She appears well-developed and well-nourished. No distress.   HENT:   Head: Normocephalic and atraumatic.   Right Ear: External ear normal.   Left Ear: External ear normal.   Nose: Nose normal.   Mouth/Throat: Oropharynx is clear and moist. No oropharyngeal exudate.   Eyes: Pupils are equal, round, and reactive to light. Conjunctivae and EOM are normal. Right eye exhibits no discharge. No scleral icterus.   Neck: Normal range of motion and full passive range of motion without pain. Neck supple. No spinous process tenderness and no muscular tenderness present. Carotid bruit is not present. No thyromegaly present.   Cardiovascular: Normal rate, regular rhythm, S1 normal, S2 normal, normal heart sounds and intact distal pulses. Exam reveals no gallop and no friction rub.   No murmur heard.  Pulmonary/Chest: Effort normal and breath sounds normal. No respiratory distress. She has no wheezes. She has no rales. She exhibits no tenderness.   Abdominal: Soft. Bowel sounds are normal. She exhibits no distension and no mass. There is no tenderness. There is no rebound and no guarding.   Genitourinary: Pelvic exam was performed with patient supine. Uterus is not deviated, not enlarged, not fixed and not tender. Cervix exhibits no motion tenderness, no discharge and no friability. Right adnexum displays no mass, no tenderness and no fullness. Left adnexum displays no mass, no tenderness and no fullness.   Musculoskeletal: Normal range of motion. She exhibits no edema or  tenderness.   Lymphadenopathy:        Head (right side): No submental and no submandibular adenopathy present.        Head (left side): No submental and no submandibular adenopathy present.     She has no cervical adenopathy.        Right cervical: No superficial cervical, no deep cervical and no posterior cervical adenopathy present.       Left cervical: No superficial cervical, no deep cervical and no posterior cervical adenopathy present.        Right axillary: No pectoral and no lateral adenopathy present.        Left axillary: No pectoral and no lateral adenopathy present.       Right: No supraclavicular adenopathy present.        Left: No supraclavicular adenopathy present.   Neurological: She is alert and oriented to person, place, and time. She has normal reflexes. No cranial nerve deficit. She exhibits normal muscle tone. Coordination normal.   Skin: Skin is warm and dry. No rash noted.   Psychiatric: She has a normal mood and affect. Her behavior is normal. Her mood appears not anxious. Her speech is not rapid and/or pressured. She is not agitated. She does not exhibit a depressed mood.   Normal behavior, thought content, insight and judgement.   Nursing note and vitals reviewed.      Assessment:       1. Annual physical exam    2. Essential hypertension    3. Impacted cerumen of right ear    4. Primary osteoarthritis of both hands    5. Need for immunization follow-up    6. Screen for colon cancer        Plan:   Stephanie was seen today for annual exam.    Diagnoses and all orders for this visit:    Annual physical exam     Essential hypertension, goal of achieving a consistent blood pressure 130/80 or less was discussed.  She agrees to increase losartan to 100 mg tablet once daily.  She is encouraged to return to the office for repeat visit to monitor her blood pressure.    Impacted cerumen of right ear, she has an ENT doctor that she seen over the years for this problem and will make a follow-up appointment  there for erect removal.    Primary osteoarthritis of both hands, she reports a loss of dexterity and she demonstrates and inability to form a tight fist or a flat fist because of arthritic changes that are present in the distal interphalangeal joints, proximal interphalangeal joints and metacarpophalangeal joints.  She does not have any red hot swollen joints.  She is educated that sometimes hand therapy can improve range of motion and restore dexterity but she is not interested in going to see a hand therapist or hand occupational therapist    Need for immunization follow-up    Screen for colon cancer, will consider the possibility of scheduling a colonoscopy in the future.    Other orders  -     losartan (COZAAR) 100 MG tablet; Take 1 tablet (100 mg total) by mouth once daily. Blood pressure    Follow up in about 3 months (around 9/11/2020) for blood pressure check.      Answers for HPI/ROS submitted by the patient on 6/9/2020   activity change: No  unexpected weight change: No  neck pain: No  hearing loss: No  rhinorrhea: No  trouble swallowing: No  eye discharge: No  visual disturbance: No  chest tightness: No  wheezing: No  chest pain: No  palpitations: No  blood in stool: No  constipation: No  vomiting: No  diarrhea: No  polydipsia: No  polyuria: No  difficulty urinating: No  hematuria: No  menstrual problem: No  dysuria: No  joint swelling: No  arthralgias: No  headaches: No  weakness: No  confusion: No  dysphoric mood: No

## 2020-06-15 ENCOUNTER — PATIENT OUTREACH (OUTPATIENT)
Dept: OTHER | Facility: OTHER | Age: 70
End: 2020-06-15

## 2020-06-15 NOTE — PROGRESS NOTES
Digital Medicine: Health  Introduction    Introduced Stephanie Perez to Digital Medicine. Discussed health  role and recommended lifestyle modifications.    I called Mrs. Perez to complete my HC enrollment call. She notes that she forgot a blood pressure pill yesterday. She is getting between 5-6 hours per night, though sometimes she can sleep longer.       The history is provided by the patient. No  was used.     HYPERTENSION  Our goal is to get BP to consistently below 130/80mmHg and make the process convenient so patient can avoid extra trips to the office. Getting your blood pressure below 130/80mmHg (definition of control) will reduce your risk for heart attack, kidney failure, stroke and death (as well as kidney failure, eye disease, & dementia)      Reviewed non-pharmacologic therapies and impact on BP.      Patient's BP goal is 130/80.Patient's BP average is 153/84 mmHg, which is above goal, per 2017 ACC/AHA Hypertension Guidelines.          Last 5 Patient Entered Readings                                      Current 30 Day Average: 153/84     Recent Readings 6/15/2020 6/11/2020 6/10/2020 6/8/2020 6/5/2020    SBP (mmHg) 140 153 155 158 148    DBP (mmHg) 81 86 86 68 89    Pulse 56 56 56 57 57            INTERVENTION(S)  encouragement/support and denied questions    PLAN  continue monitoring      There are no preventive care reminders to display for this patient.    Reviewed the importance of self-monitoring, medication adherence, and that the health  can be used as a resource for lifestyle modifications to help reduce or maintain a healthy lifestyle.    Sent link to [facility] webpages and my contact information via [employer] for future questions. Follow up scheduled.             Diet Screening   She has the following dietary restrictions: low sodium diet    She eats 1 meals and 1 snacks per day.      She notes there isn't much to change with her diet. She  eats one meal a day (dinner) which is usually a big salad with tomatoes, hardboiled eggs, radishes, cranberries, and some kind of protein. For lunch, if she is hungry, she will have cut up apples with cheese or cottage cheese with tostitos. She drinks water when she's home.     Physical Activity Screening   When asked if exercising, patient responded: yes    Patient participates in the following activities: yard/housework and walking    Right now she is walking 4 miles 3 times a week. Prior to quarantine, she was weight training 2 times a week for an hour. She would sometimes workout with a . she is active in her yard (mows, edges, rakes).    Medication Adherence Screening   She missed a dose once this week.    Tobacco and Alcohol Screening       No tobacco.     A couple of glasses of wine at night almost every night. She is reluctant to reduce.       SDOH

## 2020-06-30 ENCOUNTER — PATIENT OUTREACH (OUTPATIENT)
Dept: OTHER | Facility: OTHER | Age: 70
End: 2020-06-30

## 2020-06-30 NOTE — PROGRESS NOTES
Digital Medicine: Clinician Follow-Up    Called patient to follow up. Patient endorses adherence to medication regimen. Patient denies hypotensive s/sx (lightheadedness, dizziness, nausea, fatigue); patient denies hypertensive s/sx (SOB, CP, severe headaches, changes in vision).     Pt started increased dose of losartan, but is discouraged by the readings. She is unsure if digital cuff is accurate, as she gets lower readings with other cuffs. We discussed appropriate discrepancies, and reviewed BP monitoring technique. I also encouraged pt to take her monitor to the [facility] as she reported some physical discomfort with taking her blood pressure. Additionally, I explained to her that we could get a clinic comparison if appropriate. Pt verbalized understanding.     Pt reports there is nothing different she can do lifestyle wise to improve BP.     Patient has my contact information and knows to call with any concerns or clinical changes.        The history is provided by the patient. No  was used.   Follow Up  Follow-up reason(s): reading review      Readings are trending down       Assessment:  Reviewed recent readings. Per 2017 ACC/ AHA HTN guidelines (goal of BP < 130/80), current 30-day average needs to be addressed more thoroughly today. Pt believes elevations are d/t digital cuff.        INTERVENTION(S)  reviewed appropriate dose schedule, reviewed monitoring technique, encouragement/support and goal setting    PLAN  patient verbalizes understanding, demonstrates understanding via teach back and continue monitoring    Continue current medication regimen at this time. Pt to f/u at [facility] or let me know if she wants a clinic comparison. I will continue to monitor regularly and will follow-up in 4-6 weeks, sooner if blood pressure begins to trend upward or downward. Can consider low dose CCB if pt is amenable.       There are no preventive care reminders to display for this patient.    Last 5  Patient Entered Readings                                      Current 30 Day Average: 149/82     Recent Readings 6/29/2020 6/26/2020 6/26/2020 6/24/2020 6/23/2020    SBP (mmHg) 155 143 147 147 157    DBP (mmHg) 83 81 78 82 84    Pulse 52 59 57 55 53             Hypertension Medications             losartan (COZAAR) 100 MG tablet Take 1 tablet (100 mg total) by mouth once daily. Blood pressure                 Screenings

## 2020-07-17 DIAGNOSIS — Z71.89 COMPLEX CARE COORDINATION: ICD-10-CM

## 2020-07-20 ENCOUNTER — PATIENT OUTREACH (OUTPATIENT)
Dept: OTHER | Facility: OTHER | Age: 70
End: 2020-07-20

## 2020-08-12 ENCOUNTER — PATIENT OUTREACH (OUTPATIENT)
Dept: OTHER | Facility: OTHER | Age: 70
End: 2020-08-12

## 2020-08-17 NOTE — PROGRESS NOTES
Digital Medicine: Clinician Follow-Up    Patient returned VM. Doing well, notices BP has trended down but still slightly elevated. No complaints or concerns. She states she will go to the [facility] soon, still feeling some discomfort with the cuff. Also notes that her home cuff registers approx 10 points lower than [facility] cuff.     The history is provided by the patient.   Follow-up reason(s): routine follow up.     Patient is not experiencing signs/symptoms of hypotension.  Patient is not experiencing signs/symptoms of hypertension.          Last 5 Patient Entered Readings                                      Current 30 Day Average: 136/80     Recent Readings 8/17/2020 8/14/2020 8/14/2020 8/10/2020 8/5/2020    SBP (mmHg) 135 148 157 137 131    DBP (mmHg) 76 82 88 77 79    Pulse 64 58 55 53 54               Depression Screening  Did not address depression screening.    Sleep Apnea Screening    Did not address sleep apnea screening.     Medication Affordability Screening  Did not address medication affordability screening.     Medication Adherence-Medication adherence was asssessed.    Patient reported missing medication: once a month.    Patient identified the following reasons for non-adherence:  Patient forgets.        Patient takes meds with dinner, may have forgotten when she did not eat at home.       ASSESSMENT(S)  Patients BP average is 136/80 mmHg, which is above goal. Patient's BP goal is less than or equal to 130/80 per 2017 ACC/AHA Hypertension Guidelines.     BP elevated, but close to ACC/AHA goal. No med changes at this time.       Hypertension Plan  Continue current therapy.  Additional referral made. [facility]  I encouraged patient to remain adherent, and to visit the [facility] if she is still having issues with BP cuff. F/u in 3 months.      Addressed any questions or concerns and patient has my contact information if needed prior to next outreach. Patient verbalizes understanding.      Explained the  importance of self-monitoring and medication adherence. Encouraged the patient to communicate with their health  for lifestyle modifications to help improve or maintain a healthy lifestyle.              There are no preventive care reminders to display for this patient.      Hypertension Medications             losartan (COZAAR) 100 MG tablet Take 1 tablet (100 mg total) by mouth once daily. Blood pressure

## 2020-08-24 ENCOUNTER — PATIENT OUTREACH (OUTPATIENT)
Dept: OTHER | Facility: OTHER | Age: 70
End: 2020-08-24

## 2020-09-21 NOTE — PROGRESS NOTES
Digital Medicine: Health  Follow-Up    The history is provided by the patient.             Reason for review: Blood pressure not at goal        Topics Covered on Call: physical activity and Diet    Additional Follow-up details: I called Mrs. Perez after she left me a voicemail. BP is trending down, but patient is unsure why. No changes to her diet or exercise. She denies stress. Every now and then, she will have sciatic nerve pain but it often goes away.     She also plans on bringing the cuff to the [facility]/office visit. She notes it does squeeze tightly when taking her BP. Her personal cuff (which is older), is reading about 15-20 points lower. We talked about how that deviation is acceptable. Patient is agreeable to get it checked at the [facility].             Diet-no change to diet    No change to diet.        Physical Activity-no change to routine  No change to exercise routine.         Substance, Sleep, Stress-No change  stress-assessed  Details:Denies any stressors  Intervention(s):    Sleep-not assessed  Details:  Intervention(s):    Alcohol -not assessed  Details:  Intervention(s):    Tobacco-Not Assessed  Details:  Intervention(s):          Continue current diet/physical activity routine.  Tech support needed. Patient plans on going to the [facility] to get it checked       Addressed patient questions and patient has my contact information if needed prior to next outreach. Patient verbalizes understanding.      Explained the importance of self-monitoring and medication adherence. Encouraged the patient to communicate with their health  for lifestyle modifications to help improve or maintain a healthy lifestyle.            There are no preventive care reminders to display for this patient.    Last 5 Patient Entered Readings                                      Current 30 Day Average: 144/85     Recent Readings 9/14/2020 9/8/2020 9/4/2020 8/30/2020 8/30/2020    SBP (mmHg) 142 136 135 156 153    DBP (mmHg) 81  83 80 91 92    Pulse 52 60 61 59 58

## 2020-09-25 ENCOUNTER — PATIENT MESSAGE (OUTPATIENT)
Dept: INTERNAL MEDICINE | Facility: CLINIC | Age: 70
End: 2020-09-25

## 2020-09-25 DIAGNOSIS — Z12.31 BREAST CANCER SCREENING BY MAMMOGRAM: Primary | ICD-10-CM

## 2020-10-01 ENCOUNTER — HOSPITAL ENCOUNTER (OUTPATIENT)
Dept: RADIOLOGY | Facility: HOSPITAL | Age: 70
Discharge: HOME | End: 2020-10-01
Attending: INTERNAL MEDICINE
Payer: MEDICARE

## 2020-10-01 DIAGNOSIS — Z12.31 BREAST CANCER SCREENING BY MAMMOGRAM: ICD-10-CM

## 2020-10-01 PROCEDURE — 77067 MAMMO DIGITAL SCREENING BILAT WITH TOMO: ICD-10-PCS | Mod: 26,,, | Performed by: RADIOLOGY

## 2020-10-01 PROCEDURE — 77063 BREAST TOMOSYNTHESIS BI: CPT | Mod: 26,,, | Performed by: RADIOLOGY

## 2020-10-01 PROCEDURE — 77067 SCR MAMMO BI INCL CAD: CPT | Mod: TC

## 2020-10-01 PROCEDURE — 77067 SCR MAMMO BI INCL CAD: CPT | Mod: 26,,, | Performed by: RADIOLOGY

## 2020-10-01 PROCEDURE — 77063 MAMMO DIGITAL SCREENING BILAT WITH TOMO: ICD-10-PCS | Mod: 26,,, | Performed by: RADIOLOGY

## 2020-11-03 ENCOUNTER — PATIENT MESSAGE (OUTPATIENT)
Dept: OTHER | Facility: OTHER | Age: 70
End: 2020-11-03

## 2020-11-10 ENCOUNTER — PATIENT OUTREACH (OUTPATIENT)
Dept: OTHER | Facility: OTHER | Age: 70
End: 2020-11-10

## 2020-12-01 ENCOUNTER — PATIENT OUTREACH (OUTPATIENT)
Dept: OTHER | Facility: OTHER | Age: 70
End: 2020-12-01

## 2020-12-02 ENCOUNTER — PATIENT MESSAGE (OUTPATIENT)
Dept: ADMINISTRATIVE | Facility: HOSPITAL | Age: 70
End: 2020-12-02

## 2020-12-04 NOTE — PROGRESS NOTES
Digital Medicine: Clinician Follow-Up    Patient is doing well with no complaints. She denies major changes with nutrition, she did go out to eat earlier this week and brought home some leftovers. She acknowledges it may have had a higher salt intake. She drinks at least 20 oz of water, especially when she is at home.       The history is provided by the patient.   Follow-up reason(s): routine follow up.     Hypertension    Patient's blood pressure is stable. Patient is not experiencing signs/symptoms of hypertension.            Last 5 Patient Entered Readings                                      Current 30 Day Average: 143/81     Recent Readings 12/4/2020 11/21/2020 11/15/2020 11/14/2020 11/4/2020    SBP (mmHg) 158 134 137 155 131    DBP (mmHg) 84 79 83 87 74    Pulse 59 76 59 54 59                 Depression Screening  Did not address depression screening.    Sleep Apnea Screening    Did not address sleep apnea screening.     Medication Affordability Screening  Did not address medication affordability screening.     Medication Adherence-Medication adherence was assessed.        Takes losartan daily between 6-7 pm.      ASSESSMENT(S)  Patients BP average is 143/81 mmHg, which is above goal. Patient's BP goal is less than or equal to 130/80.     BP elevated, but fluctuates with some readings closer to goal. ARB at max dose, appropriate to consider thiazide therapy.       Hypertension Plan  Continue current therapy.  Continue current diet/physical activity routine.  Instructed to charge device.  Provided patient education.  Reviewed potential med adjustments including MOA. Patient amenable in the future. F/u in 6 weeks, if BP remains elevated will initiate HCTZ 12.5 mg.     Addressed patient questions and patient has my contact information if needed prior to next outreach. Patient verbalizes understanding.             There are no preventive care reminders to display for this patient.  There are no preventive care  reminders to display for this patient.      Hypertension Medications             losartan (COZAAR) 100 MG tablet Take 1 tablet (100 mg total) by mouth once daily. Blood pressure

## 2020-12-07 ENCOUNTER — PATIENT MESSAGE (OUTPATIENT)
Dept: ADMINISTRATIVE | Facility: OTHER | Age: 70
End: 2020-12-07

## 2021-03-18 ENCOUNTER — PATIENT MESSAGE (OUTPATIENT)
Dept: OTHER | Facility: OTHER | Age: 71
End: 2021-03-18

## 2021-04-02 NOTE — PROGRESS NOTES
Outpatient Physical Therapy  DAILY TREATMENT     [facility] Outpatient Physical Therapy  [address]  [place] [address]  Phone:  [phone]  Fax:  [phone]    Date: 04/02/2021    Patient: Han Pat  YOB: 1937  MRN: [id number]     Time Calculation    Start time: 0145  Stop time: 0240 Time Calculation (min): 55 minutes         Chief Complaint: No chief complaint on file.    Visit #: 2    SUBJECTIVE:  No new complaints today.     OBJECTIVE:        Therapeutic Exercises (CPT 85148):     1. Nustep L3 7 minutes    2. Sit to stand,  2 x 8 reps    3. Hamstring curls with ball, 2 x 12    4. Bridging , 2 x 12    5. Gastroc/soleus stretch on slant board    6. Balance work in parallel bars: SLS and tandem standing    7. Tandem walking with mod assist    8. Obstacle course with mod assist    9. Pt unable to do grape vine      Time-based treatments/modalities:    Physical Therapy Timed Treatment Charges  Gait training minutes (CPT 75347): 15 minutes  Neuromusc re-ed, balance, coor, post minutes (CPT 47864): 20 minutes  Therapeutic exercise minutes (CPT 50989): 20 minutes        ASSESSMENT:   Pt had difficulty with balance activities and needed assistance.    PLAN/RECOMMENDATIONS:   Plan for treatment: therapy treatment to continue next visit.  Planned interventions for next visit: continue with current treatment, gait training (CPT 48889), neuromuscular re-education (CPT 70123) and therapeutic exercise (CPT 48547).        Digital Medicine: Health  Follow-Up    The history is provided by the patient.             Reason for review: Blood pressure not at goal        Topics Covered on Call: physical activity, Diet and Blood pressure cuff     Additional Follow-up details: I called Mrs. Perez to follow up. She is doing well. A little disappointed that a medication change did not drastically improve readings. She notes BP cuff tightens to an uncomfortable level. She plans on going to the [facility] to get it checked.                 Diet-no change to diet    No change to diet.  Patient reports eating or drinking the following: Continues to eat the same foods. One big meal meal (dinner) and a small snack for lunch.     Additional diet details:    Physical Activity-no change to routine  No change to exercise routine.       She exercises for 60 minutes per day 5 day(s) a week.     Additional physical activity details: Weights two times a week and walking three times a week.       Medication Adherence-Medication adherence was assessed.        Substance, Sleep, Stress-No change  stress-assessed  Details:None at the moment   Intervention(s):    Sleep-not assessed  Details:  Intervention(s):    Alcohol -not assessed  Details:  Intervention(s):    Tobacco-Not Assessed  Details:  Intervention(s):        Patient did not express questions or concerns and patient has contact information if needed.    Explained the importance of self-monitoring and medication adherence. Encouraged the patient to communicate with their health  for lifestyle modifications to help improve or maintain a healthy lifestyle.        There are no preventive care reminders to display for this patient.    Last 5 Patient Entered Readings                                      Current 30 Day Average: 146/82     Recent Readings 7/20/2020 7/17/2020 7/16/2020 7/14/2020 7/8/2020    SBP (mmHg) 135 156 143 132 147    DBP (mmHg) 82 84 89 79 79    Pulse 55 54 52 57 52

## 2021-05-19 ENCOUNTER — PATIENT MESSAGE (OUTPATIENT)
Dept: ADMINISTRATIVE | Facility: OTHER | Age: 71
End: 2021-05-19

## 2021-08-12 ENCOUNTER — IMMUNIZATION (OUTPATIENT)
Dept: INTERNAL MEDICINE | Facility: CLINIC | Age: 71
End: 2021-08-12
Payer: MEDICARE

## 2021-08-12 DIAGNOSIS — Z23 NEED FOR VACCINATION: Primary | ICD-10-CM

## 2021-08-12 PROCEDURE — 91300 COVID-19, MRNA, LNP-S, PF, 30 MCG/0.3 ML DOSE VACCINE: ICD-10-PCS | Mod: ,,, | Performed by: INTERNAL MEDICINE

## 2021-08-12 PROCEDURE — 91300 COVID-19, MRNA, LNP-S, PF, 30 MCG/0.3 ML DOSE VACCINE: CPT | Mod: ,,, | Performed by: INTERNAL MEDICINE

## 2021-08-12 PROCEDURE — 0001A COVID-19, MRNA, LNP-S, PF, 30 MCG/0.3 ML DOSE VACCINE: ICD-10-PCS | Mod: CV19,,, | Performed by: INTERNAL MEDICINE

## 2021-08-12 PROCEDURE — 0001A COVID-19, MRNA, LNP-S, PF, 30 MCG/0.3 ML DOSE VACCINE: CPT | Mod: CV19,,, | Performed by: INTERNAL MEDICINE

## 2021-09-16 ENCOUNTER — PATIENT MESSAGE (OUTPATIENT)
Dept: INTERNAL MEDICINE | Facility: CLINIC | Age: 71
End: 2021-09-16

## 2021-09-16 DIAGNOSIS — Z12.31 ENCOUNTER FOR SCREENING MAMMOGRAM FOR BREAST CANCER: Primary | ICD-10-CM

## 2021-09-17 ENCOUNTER — PATIENT MESSAGE (OUTPATIENT)
Dept: INTERNAL MEDICINE | Facility: CLINIC | Age: 71
End: 2021-09-17

## 2021-09-28 ENCOUNTER — OFFICE VISIT (OUTPATIENT)
Dept: NEUROLOGY | Facility: CLINIC | Age: 71
End: 2021-09-28
Payer: MEDICARE

## 2021-09-28 VITALS
HEIGHT: 69 IN | DIASTOLIC BLOOD PRESSURE: 70 MMHG | HEART RATE: 71 BPM | SYSTOLIC BLOOD PRESSURE: 151 MMHG | BODY MASS INDEX: 17.92 KG/M2 | WEIGHT: 121 LBS

## 2021-09-28 DIAGNOSIS — G43.109 MIGRAINE WITH AURA AND WITHOUT STATUS MIGRAINOSUS, NOT INTRACTABLE: ICD-10-CM

## 2021-09-28 PROCEDURE — 99205 PR OFFICE/OUTPT VISIT, NEW, LEVL V, 60-74 MIN: ICD-10-PCS | Mod: S$PBB,,, | Performed by: PSYCHIATRY & NEUROLOGY

## 2021-09-28 PROCEDURE — 99999 PR PBB SHADOW E&M-EST. PATIENT-LVL IV: CPT | Mod: PBBFAC,,, | Performed by: PSYCHIATRY & NEUROLOGY

## 2021-09-28 PROCEDURE — 99214 OFFICE O/P EST MOD 30 MIN: CPT | Mod: PBBFAC | Performed by: PSYCHIATRY & NEUROLOGY

## 2021-09-28 PROCEDURE — 99205 OFFICE O/P NEW HI 60 MIN: CPT | Mod: S$PBB,,, | Performed by: PSYCHIATRY & NEUROLOGY

## 2021-09-28 PROCEDURE — 99999 PR PBB SHADOW E&M-EST. PATIENT-LVL IV: ICD-10-PCS | Mod: PBBFAC,,, | Performed by: PSYCHIATRY & NEUROLOGY

## 2021-09-28 RX ORDER — SUMATRIPTAN 50 MG/1
TABLET, FILM COATED ORAL
Qty: 9 TABLET | Refills: 11 | Status: SHIPPED | OUTPATIENT
Start: 2021-09-28

## 2021-10-14 ENCOUNTER — TELEPHONE (OUTPATIENT)
Dept: INTERNAL MEDICINE | Facility: CLINIC | Age: 71
End: 2021-10-14

## 2021-11-12 ENCOUNTER — HOSPITAL ENCOUNTER (OUTPATIENT)
Dept: RADIOLOGY | Facility: HOSPITAL | Age: 71
Discharge: HOME | End: 2021-11-12
Attending: INTERNAL MEDICINE
Payer: MEDICARE

## 2021-11-12 VITALS — BODY MASS INDEX: 18.07 KG/M2 | HEIGHT: 69 IN | WEIGHT: 122 LBS

## 2021-11-12 DIAGNOSIS — Z12.31 ENCOUNTER FOR SCREENING MAMMOGRAM FOR BREAST CANCER: ICD-10-CM

## 2021-11-12 PROCEDURE — 77063 BREAST TOMOSYNTHESIS BI: CPT | Mod: 26,,, | Performed by: RADIOLOGY

## 2021-11-12 PROCEDURE — 77067 SCR MAMMO BI INCL CAD: CPT | Mod: 26,,, | Performed by: RADIOLOGY

## 2021-11-12 PROCEDURE — 77063 MAMMO DIGITAL SCREENING BILAT WITH TOMO: ICD-10-PCS | Mod: 26,,, | Performed by: RADIOLOGY

## 2021-11-12 PROCEDURE — 77067 SCR MAMMO BI INCL CAD: CPT | Mod: TC

## 2021-11-12 PROCEDURE — 77067 MAMMO DIGITAL SCREENING BILAT WITH TOMO: ICD-10-PCS | Mod: 26,,, | Performed by: RADIOLOGY

## 2021-11-16 ENCOUNTER — PATIENT MESSAGE (OUTPATIENT)
Dept: ADMINISTRATIVE | Facility: OTHER | Age: 71
End: 2021-11-16
Payer: COMMERCIAL

## 2022-01-05 ENCOUNTER — OFFICE VISIT (OUTPATIENT)
Dept: INTERNAL MEDICINE | Facility: CLINIC | Age: 72
End: 2022-01-05
Payer: MEDICARE

## 2022-01-05 ENCOUNTER — LAB VISIT (OUTPATIENT)
Dept: LAB | Facility: HOSPITAL | Age: 72
End: 2022-01-05
Attending: INTERNAL MEDICINE
Payer: MEDICARE

## 2022-01-05 VITALS
WEIGHT: 122.81 LBS | OXYGEN SATURATION: 99 % | HEIGHT: 69 IN | HEART RATE: 61 BPM | RESPIRATION RATE: 16 BRPM | BODY MASS INDEX: 18.19 KG/M2 | SYSTOLIC BLOOD PRESSURE: 108 MMHG | DIASTOLIC BLOOD PRESSURE: 62 MMHG

## 2022-01-05 DIAGNOSIS — Z11.59 ENCOUNTER FOR HEPATITIS C SCREENING TEST FOR LOW RISK PATIENT: ICD-10-CM

## 2022-01-05 DIAGNOSIS — D75.89 MACROCYTOSIS WITHOUT ANEMIA: ICD-10-CM

## 2022-01-05 DIAGNOSIS — I10 ESSENTIAL HYPERTENSION: ICD-10-CM

## 2022-01-05 DIAGNOSIS — Z00.00 ANNUAL PHYSICAL EXAM: Primary | ICD-10-CM

## 2022-01-05 DIAGNOSIS — G43.109 MIGRAINE WITH AURA AND WITHOUT STATUS MIGRAINOSUS, NOT INTRACTABLE: ICD-10-CM

## 2022-01-05 DIAGNOSIS — E78.5 HYPERLIPIDEMIA, UNSPECIFIED HYPERLIPIDEMIA TYPE: ICD-10-CM

## 2022-01-05 DIAGNOSIS — Z01.419 ROUTINE GYNECOLOGICAL EXAMINATION: ICD-10-CM

## 2022-01-05 DIAGNOSIS — Z12.11 SCREEN FOR COLON CANCER: ICD-10-CM

## 2022-01-05 LAB
BASOPHILS # BLD AUTO: 0.03 K/UL (ref 0–0.2)
BASOPHILS NFR BLD: 0.6 % (ref 0–1.9)
DIFFERENTIAL METHOD: ABNORMAL
EOSINOPHIL # BLD AUTO: 0.1 K/UL (ref 0–0.5)
EOSINOPHIL NFR BLD: 1.6 % (ref 0–8)
ERYTHROCYTE [DISTWIDTH] IN BLOOD BY AUTOMATED COUNT: 12.4 % (ref 11.5–14.5)
HCT VFR BLD AUTO: 40.8 % (ref 37–48.5)
HGB BLD-MCNC: 13.2 G/DL (ref 12–16)
IMM GRANULOCYTES # BLD AUTO: 0.01 K/UL (ref 0–0.04)
IMM GRANULOCYTES NFR BLD AUTO: 0.2 % (ref 0–0.5)
LYMPHOCYTES # BLD AUTO: 1.7 K/UL (ref 1–4.8)
LYMPHOCYTES NFR BLD: 34.9 % (ref 18–48)
MCH RBC QN AUTO: 32 PG (ref 27–31)
MCHC RBC AUTO-ENTMCNC: 32.4 G/DL (ref 32–36)
MCV RBC AUTO: 99 FL (ref 82–98)
MONOCYTES # BLD AUTO: 0.5 K/UL (ref 0.3–1)
MONOCYTES NFR BLD: 10 % (ref 4–15)
NEUTROPHILS # BLD AUTO: 2.6 K/UL (ref 1.8–7.7)
NEUTROPHILS NFR BLD: 52.7 % (ref 38–73)
NRBC BLD-RTO: 0 /100 WBC
PLATELET # BLD AUTO: 249 K/UL (ref 150–450)
PMV BLD AUTO: 10.4 FL (ref 9.2–12.9)
RBC # BLD AUTO: 4.12 M/UL (ref 4–5.4)
WBC # BLD AUTO: 4.9 K/UL (ref 3.9–12.7)

## 2022-01-05 PROCEDURE — 99999 PR PBB SHADOW E&M-EST. PATIENT-LVL V: ICD-10-PCS | Mod: PBBFAC,,, | Performed by: INTERNAL MEDICINE

## 2022-01-05 PROCEDURE — 99999 PR PBB SHADOW E&M-EST. PATIENT-LVL V: CPT | Mod: PBBFAC,,, | Performed by: INTERNAL MEDICINE

## 2022-01-05 PROCEDURE — 86803 HEPATITIS C AB TEST: CPT | Performed by: INTERNAL MEDICINE

## 2022-01-05 PROCEDURE — 99397 PR PREVENTIVE VISIT,EST,65 & OVER: ICD-10-PCS | Mod: S$PBB,,, | Performed by: INTERNAL MEDICINE

## 2022-01-05 PROCEDURE — 99397 PER PM REEVAL EST PAT 65+ YR: CPT | Mod: S$PBB,,, | Performed by: INTERNAL MEDICINE

## 2022-01-05 PROCEDURE — 85025 COMPLETE CBC W/AUTO DIFF WBC: CPT | Performed by: INTERNAL MEDICINE

## 2022-01-05 PROCEDURE — 99215 OFFICE O/P EST HI 40 MIN: CPT | Mod: PBBFAC | Performed by: INTERNAL MEDICINE

## 2022-01-05 PROCEDURE — 36415 COLL VENOUS BLD VENIPUNCTURE: CPT | Performed by: INTERNAL MEDICINE

## 2022-01-05 RX ORDER — LOSARTAN POTASSIUM AND HYDROCHLOROTHIAZIDE 12.5; 1 MG/1; MG/1
1 TABLET ORAL DAILY
Qty: 90 TABLET | Refills: 1 | Status: SHIPPED | OUTPATIENT
Start: 2022-01-05 | End: 2022-09-01 | Stop reason: SDUPTHER

## 2022-01-06 LAB — HCV AB SERPL QL IA: NEGATIVE

## 2022-01-20 NOTE — PROGRESS NOTES
Subjective:       Patient ID: Stephanie Perez is a 71 y.o. female.    Chief Complaint: Annual Exam    This dictation was performed using using MModal.   She presents for a routine annual physical, with no concerns.  Screenings and immunizations are discussed  Chronic medical problems reviewed and prescription medications refilled.   has a past medical history of Essential hypertension (2/12/2019).   No past surgical history on file.  HPI  Review of Systems  Review of systems is negative unless noted.  Objective:      Physical Exam  Vitals reviewed.   Constitutional:       Appearance: She is well-nourished.   HENT:      Head: Atraumatic.   Eyes:      General: No scleral icterus.     Conjunctiva/sclera: Conjunctivae normal.   Cardiovascular:      Rate and Rhythm: Normal rate and regular rhythm.   Pulmonary:      Effort: Pulmonary effort is normal.      Breath sounds: Normal breath sounds.   Abdominal:      Palpations: Abdomen is soft.      Tenderness: There is no abdominal tenderness.   Musculoskeletal:         General: No edema.      Cervical back: Neck supple.   Lymphadenopathy:      Cervical: No cervical adenopathy.   Skin:     General: Skin is warm and dry.   Neurological:      Mental Status: She is alert.   Psychiatric:         Mood and Affect: Mood and affect normal.         Behavior: Behavior normal.         Assessment:       1. Annual physical exam    2. Hyperlipidemia, unspecified hyperlipidemia type    3. Essential hypertension    4. Migraine with aura and without status migrainosus, not intractable    5. Screen for colon cancer    6. Routine gynecological examination    7. Macrocytosis without anemia    8. Encounter for hepatitis C screening test for low risk patient        Plan:   Stephanie was seen today for annual exam.    Diagnoses and all orders for this visit:    Annual physical exam    Hyperlipidemia, unspecified hyperlipidemia type    Essential hypertension  -     losartan-hydrochlorothiazide 100-12.5 mg  (HYZAAR) 100-12.5 mg Tab; Take 1 tablet by mouth once daily.    Migraine with aura and without status migrainosus, not intractable    Screen for colon cancer  -     Case Request Endoscopy: COLONOSCOPY    Routine gynecological examination  -     Ambulatory referral/consult to Gynecology; Future    Macrocytosis without anemia  -     CBC Auto Differential; Future    Encounter for hepatitis C screening test for low risk patient  -     Hepatitis C Antibody; Future      Follow up in about 6 months (around 7/5/2022).

## 2022-03-10 ENCOUNTER — PES CALL (OUTPATIENT)
Dept: ADMINISTRATIVE | Facility: CLINIC | Age: 72
End: 2022-03-10
Payer: COMMERCIAL

## 2022-06-12 ENCOUNTER — PATIENT MESSAGE (OUTPATIENT)
Dept: ADMINISTRATIVE | Facility: OTHER | Age: 72
End: 2022-06-12
Payer: COMMERCIAL

## 2022-09-01 ENCOUNTER — PATIENT MESSAGE (OUTPATIENT)
Dept: OTHER | Facility: OTHER | Age: 72
End: 2022-09-01
Payer: COMMERCIAL

## 2022-09-10 ENCOUNTER — PATIENT MESSAGE (OUTPATIENT)
Dept: OTHER | Facility: OTHER | Age: 72
End: 2022-09-10
Payer: COMMERCIAL

## 2022-09-12 ENCOUNTER — TELEPHONE (OUTPATIENT)
Dept: PRIMARY CARE CLINIC | Facility: CLINIC | Age: 72
End: 2022-09-12
Payer: COMMERCIAL

## 2022-09-12 DIAGNOSIS — Z12.39 ENCOUNTER FOR SCREENING FOR MALIGNANT NEOPLASM OF BREAST, UNSPECIFIED SCREENING MODALITY: Primary | ICD-10-CM

## 2022-09-12 NOTE — TELEPHONE ENCOUNTER
----- Message from Ellen Chowdhury sent at 9/12/2022  7:36 AM CDT -----  Contact: pt [phone]  Patient is scheduled to see you in Jan 2023. Requesting orders for a mammo.    Please call to schedule.    Thank you

## 2022-11-09 ENCOUNTER — PATIENT MESSAGE (OUTPATIENT)
Dept: ADMINISTRATIVE | Facility: OTHER | Age: 72
End: 2022-11-09
Payer: COMMERCIAL

## 2022-11-18 ENCOUNTER — HOSPITAL ENCOUNTER (OUTPATIENT)
Dept: RADIOLOGY | Facility: HOSPITAL | Age: 72
Discharge: HOME | End: 2022-11-18
Attending: INTERNAL MEDICINE
Payer: MEDICARE

## 2022-11-18 DIAGNOSIS — Z12.39 ENCOUNTER FOR SCREENING FOR MALIGNANT NEOPLASM OF BREAST, UNSPECIFIED SCREENING MODALITY: ICD-10-CM

## 2022-11-18 PROCEDURE — 77067 SCR MAMMO BI INCL CAD: CPT | Mod: TC

## 2022-11-18 PROCEDURE — 77067 SCR MAMMO BI INCL CAD: CPT | Mod: 26,,, | Performed by: RADIOLOGY

## 2022-11-18 PROCEDURE — 77063 MAMMO DIGITAL SCREENING BILAT WITH TOMO: ICD-10-PCS | Mod: 26,,, | Performed by: RADIOLOGY

## 2022-11-18 PROCEDURE — 77067 MAMMO DIGITAL SCREENING BILAT WITH TOMO: ICD-10-PCS | Mod: 26,,, | Performed by: RADIOLOGY

## 2022-11-18 PROCEDURE — 77063 BREAST TOMOSYNTHESIS BI: CPT | Mod: 26,,, | Performed by: RADIOLOGY

## 2022-11-18 PROCEDURE — 77063 BREAST TOMOSYNTHESIS BI: CPT | Mod: TC

## 2022-11-21 ENCOUNTER — PATIENT MESSAGE (OUTPATIENT)
Dept: PRIMARY CARE CLINIC | Facility: CLINIC | Age: 72
End: 2022-11-21
Payer: COMMERCIAL

## 2023-01-18 ENCOUNTER — LAB VISIT (OUTPATIENT)
Dept: LAB | Facility: HOSPITAL | Age: 73
End: 2023-01-18
Attending: INTERNAL MEDICINE
Payer: MEDICARE

## 2023-01-18 ENCOUNTER — TELEPHONE (OUTPATIENT)
Dept: PRIMARY CARE CLINIC | Facility: CLINIC | Age: 73
End: 2023-01-18
Payer: COMMERCIAL

## 2023-01-18 ENCOUNTER — OFFICE VISIT (OUTPATIENT)
Dept: PRIMARY CARE CLINIC | Facility: CLINIC | Age: 73
End: 2023-01-18
Payer: MEDICARE

## 2023-01-18 VITALS
WEIGHT: 126.31 LBS | HEIGHT: 69 IN | RESPIRATION RATE: 18 BRPM | SYSTOLIC BLOOD PRESSURE: 132 MMHG | OXYGEN SATURATION: 97 % | DIASTOLIC BLOOD PRESSURE: 72 MMHG | HEART RATE: 80 BPM | BODY MASS INDEX: 18.71 KG/M2

## 2023-01-18 DIAGNOSIS — Z00.00 PREVENTATIVE HEALTH CARE: ICD-10-CM

## 2023-01-18 DIAGNOSIS — I10 ESSENTIAL HYPERTENSION: ICD-10-CM

## 2023-01-18 DIAGNOSIS — I73.00 RAYNAUD'S DISEASE WITHOUT GANGRENE: ICD-10-CM

## 2023-01-18 DIAGNOSIS — E55.9 VITAMIN D DEFICIENCY: ICD-10-CM

## 2023-01-18 DIAGNOSIS — M19.041 PRIMARY OSTEOARTHRITIS OF BOTH HANDS: ICD-10-CM

## 2023-01-18 DIAGNOSIS — Z12.11 SCREENING FOR MALIGNANT NEOPLASM OF COLON: ICD-10-CM

## 2023-01-18 DIAGNOSIS — M19.042 PRIMARY OSTEOARTHRITIS OF BOTH HANDS: ICD-10-CM

## 2023-01-18 DIAGNOSIS — G43.109 MIGRAINE WITH AURA AND WITHOUT STATUS MIGRAINOSUS, NOT INTRACTABLE: ICD-10-CM

## 2023-01-18 DIAGNOSIS — Z00.00 PREVENTATIVE HEALTH CARE: Primary | ICD-10-CM

## 2023-01-18 PROBLEM — R09.89 LABILE BLOOD PRESSURE: Status: RESOLVED | Noted: 2019-08-07 | Resolved: 2023-01-18

## 2023-01-18 PROBLEM — E78.5 HYPERLIPIDEMIA: Status: RESOLVED | Noted: 2020-02-06 | Resolved: 2023-01-18

## 2023-01-18 LAB
25(OH)D3+25(OH)D2 SERPL-MCNC: 59 NG/ML (ref 30–96)
ALBUMIN SERPL BCP-MCNC: 4.4 G/DL (ref 3.5–5.2)
ALP SERPL-CCNC: 58 U/L (ref 55–135)
ALT SERPL W/O P-5'-P-CCNC: 16 U/L (ref 10–44)
ANION GAP SERPL CALC-SCNC: 8 MMOL/L (ref 8–16)
AST SERPL-CCNC: 30 U/L (ref 10–40)
BASOPHILS # BLD AUTO: 0.04 K/UL (ref 0–0.2)
BASOPHILS NFR BLD: 0.9 % (ref 0–1.9)
BILIRUB SERPL-MCNC: 0.9 MG/DL (ref 0.1–1)
BUN SERPL-MCNC: 22 MG/DL (ref 8–23)
CALCIUM SERPL-MCNC: 10.5 MG/DL (ref 8.7–10.5)
CHLORIDE SERPL-SCNC: 103 MMOL/L (ref 95–110)
CO2 SERPL-SCNC: 29 MMOL/L (ref 23–29)
CREAT SERPL-MCNC: 0.9 MG/DL (ref 0.5–1.4)
DIFFERENTIAL METHOD: ABNORMAL
EOSINOPHIL # BLD AUTO: 0.1 K/UL (ref 0–0.5)
EOSINOPHIL NFR BLD: 1.7 % (ref 0–8)
ERYTHROCYTE [DISTWIDTH] IN BLOOD BY AUTOMATED COUNT: 11.9 % (ref 11.5–14.5)
EST. GFR  (NO RACE VARIABLE): >60 ML/MIN/1.73 M^2
GLUCOSE SERPL-MCNC: 96 MG/DL (ref 70–110)
HCT VFR BLD AUTO: 41.4 % (ref 37–48.5)
HGB BLD-MCNC: 13.5 G/DL (ref 12–16)
IMM GRANULOCYTES # BLD AUTO: 0.02 K/UL (ref 0–0.04)
IMM GRANULOCYTES NFR BLD AUTO: 0.4 % (ref 0–0.5)
LYMPHOCYTES # BLD AUTO: 1.4 K/UL (ref 1–4.8)
LYMPHOCYTES NFR BLD: 30.9 % (ref 18–48)
MAGNESIUM SERPL-MCNC: 1.9 MG/DL (ref 1.6–2.6)
MCH RBC QN AUTO: 32.9 PG (ref 27–31)
MCHC RBC AUTO-ENTMCNC: 32.6 G/DL (ref 32–36)
MCV RBC AUTO: 101 FL (ref 82–98)
MONOCYTES # BLD AUTO: 0.4 K/UL (ref 0.3–1)
MONOCYTES NFR BLD: 7.6 % (ref 4–15)
NEUTROPHILS # BLD AUTO: 2.7 K/UL (ref 1.8–7.7)
NEUTROPHILS NFR BLD: 58.5 % (ref 38–73)
NRBC BLD-RTO: 0 /100 WBC
PLATELET # BLD AUTO: 218 K/UL (ref 150–450)
PMV BLD AUTO: 10.4 FL (ref 9.2–12.9)
POTASSIUM SERPL-SCNC: 5.3 MMOL/L (ref 3.5–5.1)
PROT SERPL-MCNC: 7.8 G/DL (ref 6–8.4)
RBC # BLD AUTO: 4.1 M/UL (ref 4–5.4)
SODIUM SERPL-SCNC: 140 MMOL/L (ref 136–145)
WBC # BLD AUTO: 4.6 K/UL (ref 3.9–12.7)

## 2023-01-18 PROCEDURE — 80053 COMPREHEN METABOLIC PANEL: CPT | Performed by: INTERNAL MEDICINE

## 2023-01-18 PROCEDURE — 85025 COMPLETE CBC W/AUTO DIFF WBC: CPT | Performed by: INTERNAL MEDICINE

## 2023-01-18 PROCEDURE — 99999 PR PBB SHADOW E&M-EST. PATIENT-LVL IV: CPT | Mod: PBBFAC,,, | Performed by: INTERNAL MEDICINE

## 2023-01-18 PROCEDURE — 99397 PR PREVENTIVE VISIT,EST,65 & OVER: ICD-10-PCS | Mod: 25,S$PBB,GZ, | Performed by: INTERNAL MEDICINE

## 2023-01-18 PROCEDURE — 83735 ASSAY OF MAGNESIUM: CPT | Performed by: INTERNAL MEDICINE

## 2023-01-18 PROCEDURE — 36415 COLL VENOUS BLD VENIPUNCTURE: CPT | Mod: PN | Performed by: INTERNAL MEDICINE

## 2023-01-18 PROCEDURE — 99999 PR PBB SHADOW E&M-EST. PATIENT-LVL IV: ICD-10-PCS | Mod: PBBFAC,,, | Performed by: INTERNAL MEDICINE

## 2023-01-18 PROCEDURE — 90677 PCV20 VACCINE IM: CPT | Mod: PBBFAC,PN

## 2023-01-18 PROCEDURE — 99397 PER PM REEVAL EST PAT 65+ YR: CPT | Mod: 25,S$PBB,GZ, | Performed by: INTERNAL MEDICINE

## 2023-01-18 PROCEDURE — 99214 OFFICE O/P EST MOD 30 MIN: CPT | Mod: PBBFAC,PN,25 | Performed by: INTERNAL MEDICINE

## 2023-01-18 PROCEDURE — 82306 VITAMIN D 25 HYDROXY: CPT | Performed by: INTERNAL MEDICINE

## 2023-01-18 NOTE — PROGRESS NOTES
[facility]Pediatric[facility] Progress Note      Chief Complaint     Establish Care   for annual PE    Subjective:      History of Present Illness:  Stephanie Perez is a 72 y.o. female here for annual PE; former pt of Dr. Cornell who has retired.    HTN dx'ed in 2021; initially placed on losartan 100mg but needed Losartan/HCTZ 100/12.5 mg daily for better BP control  - goes to gym bi-weekly and lifts weights; rides bike at home  -Has been checking Bps at home usually 120/70s.  Active, cuts her own grass; does all ADLs independently; denies SOB, MOTTA, CP  -takes OTC Mag and K  -weight is stable     OA hands: takes chondroitin sulfate    Raynaud's: stable     Migraine with aura: takes Imitrex prn - triggered by heat now; only takes annually; saw neurologist, Esther Vargas    SH: lives alone since ronnell  in  from colon cancer; has son who lives 2 doors down; 5oz wine pm, tobacco, drugs  FH: mom breast and lung cancer but smoked; PGM: breast cancer; sister Gretchen s/p double mastectomy at ; dad  of lymphoma; 2 sisters with Grave's; 2 sisters with Raynaud's  Past Medical History:  Past Medical History:   Diagnosis Date    Essential hypertension 2019    Hyperlipidemia 2020    Labile blood pressure 2019       Past Surgical History:  No past surgical history on file.    Allergies:  Review of patient's allergies indicates:  No Known Allergies    Home Medications:  Current Outpatient Medications   Medication Sig Dispense Refill    chondroitin sulfate A (CHONDROITIN SULFATE ORAL) Take by mouth.      losartan-hydrochlorothiazide 100-12.5 mg (HYZAAR) 100-12.5 mg Tab Take 1 tablet by mouth once daily. 90 tablet 1    magnesium oxide (MAG-OX) 400 mg (241.3 mg magnesium) tablet Take 400 mg by mouth once daily.      multivitamin (THERAGRAN) per tablet Take 1 tablet by mouth once daily.      omega-3 fatty acids/fish oil (FISH OIL-OMEGA-3 FATTY ACIDS) 300-1,000 mg capsule Take 2 capsules by mouth once  "daily.      potassium 99 mg Tab Take 3 tablets by mouth once daily.      sumatriptan (IMITREX) 50 MG tablet Take 1/2 to 1 tab at onset of headache.  If no improvement in 2 hours, take another.  Do not take more than 2 in 24 hours. 9 tablet 11     No current facility-administered medications for this visit.        Family History:  Family History   Problem Relation Age of Onset    Breast cancer Mother         dcis    Breast cancer Sister 50        dcis    Breast cancer Paternal Grandmother     Breast cancer Sister 63       Social History:  Social History     Tobacco Use    Smoking status: Former    Smokeless tobacco: Never   Substance Use Topics    Alcohol use: Not Currently    Drug use: Never       Review of Systems:  Pertinent positives and negatives listed in HPI. All other systems are reviewed and are negative.    Health Maintaince :   Health Maintenance Topics with due status: Not Due       Topic Last Completion Date    Lipid Panel 06/04/2020    Mammogram 11/18/2022           Eye: needs exam; s/p cataract removal with 20/20  Dental: UTD every 6 mo - next week     Immunizations:   Tdap: n/a.  Influenza: needs .  Pneumovax: needs today  Shingrex x 2: needs  COVID: needs COVID booster   Hepatitis C:   Cancer Screening:  PAP: UTD   Mammogram: UTD 11/2022  Colonoscopy: needs stool FIT   DEXA:  WNL      The ASCVD Risk score (Anne DK, et al., 2019) failed to calculate for the following reasons:    The valid HDL cholesterol range is 20 to 100 mg/dL      Objective:   /72 (BP Location: Right arm, Patient Position: Sitting, BP Method: Small (Manual))   Pulse 80   Resp 18   Ht 5' 9" (1.753 m)   Wt 57.3 kg (126 lb 5.2 oz)   LMP 01/01/1980 Comment: tubal 1980  SpO2 97%   BMI 18.65 kg/m²      Body mass index is 18.65 kg/m².       Physical Examination:  General: Alert and awake in no apparent distress  HEENT: Normocephalic and atraumatic; Tms WNL  Eyes:  PERRL; EOMi with anicteric sclera and clear " conjunctivae  Mouth:  Oropharynx clear and without exudate; moist mucous membranes  Neck:   Cervical nodes not enlarged; supple; no bruits  Cardio:  Regular rate and rhythm with normal S1 and S2; no murmurs or rubs  Resp:  CTAB; respirations unlabored; no wheezes, crackles or rhonchi  Abdom: Soft, NTND with normoactive bowel sounds; negative HSM  Extrem: Warm and well-perfused with no clubbing, cyanosis or edema  Skin:  No rashes, lesions, or color changes  Pulses:  2+ and symmetric distally  Neuro:  AAOx3; cooperative and pleasant with no focal deficits    Laboratory:      Most Recent Data:  Lab Results   Component Value Date    WBC 4.90 01/05/2022    HGB 13.2 01/05/2022    HCT 40.8 01/05/2022     01/05/2022    CHOL 276 (H) 06/04/2020    TRIG 44 06/04/2020    HDL >140 (H) 06/04/2020    ALT 18 06/04/2020    AST 37 06/04/2020     06/04/2020    K 4.3 06/04/2020     06/04/2020    BUN 21 06/04/2020    CO2 28 06/04/2020    TSH 1.088 06/04/2020              CBC:   WBC   Date Value Ref Range Status   01/05/2022 4.90 3.90 - 12.70 K/uL Final     Hemoglobin   Date Value Ref Range Status   01/05/2022 13.2 12.0 - 16.0 g/dL Final     Hematocrit   Date Value Ref Range Status   01/05/2022 40.8 37.0 - 48.5 % Final     Platelets   Date Value Ref Range Status   01/05/2022 249 150 - 450 K/uL Final     MCV   Date Value Ref Range Status   01/05/2022 99 (H) 82 - 98 fL Final     RDW   Date Value Ref Range Status   01/05/2022 12.4 11.5 - 14.5 % Final     BMP:   Sodium   Date Value Ref Range Status   06/04/2020 140 136 - 145 mmol/L Final     Potassium   Date Value Ref Range Status   06/04/2020 4.3 3.5 - 5.1 mmol/L Final     Chloride   Date Value Ref Range Status   06/04/2020 103 95 - 110 mmol/L Final     CO2   Date Value Ref Range Status   06/04/2020 28 23 - 29 mmol/L Final     BUN   Date Value Ref Range Status   06/04/2020 21 8 - 23 mg/dL Final     Creatinine   Date Value Ref Range Status   06/04/2020 0.8 0.5 - 1.4 mg/dL  Final     Glucose   Date Value Ref Range Status   06/04/2020 101 70 - 110 mg/dL Final     Calcium   Date Value Ref Range Status   06/04/2020 9.8 8.7 - 10.5 mg/dL Final     LFTs:   Total Protein   Date Value Ref Range Status   06/04/2020 7.5 6.0 - 8.4 g/dL Final     Albumin   Date Value Ref Range Status   06/04/2020 4.3 3.5 - 5.2 g/dL Final     Total Bilirubin   Date Value Ref Range Status   06/04/2020 0.8 0.1 - 1.0 mg/dL Final     Comment:     For infants and newborns, interpretation of results should be based  on gestational age, weight and in agreement with clinical  observations.  Premature Infant recommended reference ranges:  Up to 24 hours.............<8.0 mg/dL  Up to 48 hours............<12.0 mg/dL  3-5 days..................<15.0 mg/dL  6-29 days.................<15.0 mg/dL       AST   Date Value Ref Range Status   06/04/2020 37 10 - 40 U/L Final     Alkaline Phosphatase   Date Value Ref Range Status   06/04/2020 67 55 - 135 U/L Final     ALT   Date Value Ref Range Status   06/04/2020 18 10 - 44 U/L Final     Coags: No results found for: INR, PROTIME, PTT  FLP:      Lab Results   Component Value Date    CHOL 276 (H) 06/04/2020    CHOL 282 (H) 02/12/2019     Lab Results   Component Value Date    HDL >140 (H) 06/04/2020     (H) 02/12/2019     Lab Results   Component Value Date    LDLCALC Unable to calculate 06/04/2020    LDLCALC 138.4 02/12/2019     Lab Results   Component Value Date    TRIG 44 06/04/2020    TRIG 48 02/12/2019     Lab Results   Component Value Date    CHOLHDL Unable to calculate 06/04/2020    CHOLHDL 47.5 02/12/2019      DM:      LDL Cholesterol   Date Value Ref Range Status   06/04/2020 Unable to calculate 63.0 - 159.0 mg/dL Final     Comment:     The National Cholesterol Education Program (NCEP) has set the  following guidelines (reference values) for LDL Cholesterol:  Optimal.......................<130 mg/dL  Borderline High...............130-159  mg/dL  High..........................160-189 mg/dL  Very High.....................>190 mg/dL       Creatinine   Date Value Ref Range Status   06/04/2020 0.8 0.5 - 1.4 mg/dL Final     Thyroid:   TSH   Date Value Ref Range Status   06/04/2020 1.088 0.400 - 4.000 uIU/mL Final     Anemia: No results found for: IRON, TIBC, FERRITIN, YNRPODDC19, FOLATE  Cardiac: No results found for: TROPONINI, CKTOTAL, CKMB, BNP  Urinalysis: No results found for: LABURIN, COLORU, PHUA, CLARITYU, SPECGRAV, LABSPEC, NITRITE, PROTEINUR, GLUCOSEU, KETONESU, UROBILINOGEN, BILIRUBINUR, BLOODU, RBCU, WBCUA    Other Laboratory Data:      Other Results:  EKG (my interpretation):     Radiology:  Mammo Digital Screening Bilat w/ Marcellus  Narrative: Result:  Mammo Digital Screening Bilat w/ Marcellus    History:  Patient is 72 y.o. and is seen for a screening mammogram.    Films Compared:  Prior images (if available) were compared.     Findings:   This procedure was performed using tomosynthesis.   Computer-aided detection was utilized in the interpretation of this   examination.    The breasts are heterogeneously dense, which may obscure small masses.   There is no evidence of suspicious masses, microcalcifications or   architectural distortion.  Impression:    No mammographic evidence of malignancy.    BI-RADS Category 1: Negative    Recommendation:  Routine screening mammogram in 1 year is recommended.    Your estimated lifetime risk of breast cancer (to age 85) based on   Tyrer-Cuzick risk assessment model is 7.16 %.  According to the American   Cancer Society, patients with a lifetime breast cancer risk of 20% or   higher might benefit from supplemental screening tests. ??           Assessment/Plan     Stephanie Perez is a 72 y.o. female with:  1. Preventative health care  Assessment & Plan:  -check stool FIT  -get Prevnar 20 today    Orders:  -     COMPREHENSIVE METABOLIC PANEL; Future; Expected date: 01/18/2023  -     (In Office Administered)  Pneumococcal Conjugate Vaccine (20 Valent) (IM)    2. Screening for malignant neoplasm of colon  Assessment & Plan:  -check stool FIT  -get Prevnar 20 today    Orders:  -     Fecal Immunochemical Test (iFOBT); Future; Expected date: 01/18/2023    3. Vitamin D deficiency  Assessment & Plan:  -check Vit D level     Orders:  -     Vitamin D; Future; Expected date: 01/18/2023    4. Primary osteoarthritis of both hands  Assessment & Plan:  -takes glucosamine chondroitin sulfate bid       5. Migraine with aura and without status migrainosus, not intractable  Assessment & Plan:  -take Imitrex 50mg prn (annually)      6. Raynaud's disease without gangrene  Assessment & Plan:  -stable   -check CBC with d/p      7. Essential hypertension  Assessment & Plan:  -stable on Hyzaar 100/12.5 mg po daily  -Check Bps at home weekly and prn symptoms for goal BP <130/80.  -Start healthy diet high in fiber (25-35 mg daily), low fat dairy, lean protein, low in saturated/trans fat; high in calcium/magnesium/potassium; 1.5 gm sodium diet; low in processed sugars and foods, ie avoid WHITE sugars, bread, pasta, rice, ice cream  -Drink 6-8 glasses of water daily  -Moderate exercise 30 min 5 times per week or 75 min intense exercise  -Start 8-10 hour intermittent fasting diet   -Avoid eating 3 hours prior to bedtime  -Reduce alcohol to 1-2 glasses per day  -Avoid smoking, vaping   -Avoid NSAIDs        Orders:  -     CBC W/ AUTO DIFFERENTIAL; Future; Expected date: 01/18/2023  -     COMPREHENSIVE METABOLIC PANEL; Future; Expected date: 01/18/2023  -     Magnesium; Future; Expected date: 01/18/2023             I spent a total of 38 minutes on the day of the visit.This includes face to face time and non-face to face time preparing to see the patient (eg, review of tests), obtaining and/or reviewing separately obtained history, documenting clinical information in the electronic or other health record, independently interpreting results and  communicating results to the patient/family/caregiver, or care coordinator.   Code Status:     Vanessa Sauceda MD

## 2023-01-18 NOTE — PATIENT INSTRUCTIONS
-Check Bps at home weekly and prn symptoms for goal BP <130/80.  -Start healthy diet high in fiber (25-35 mg daily), low fat dairy, lean protein, low in saturated/trans fat; high in calcium/magnesium/potassium; 1.5 gm sodium diet; low in processed sugars and foods, ie avoid WHITE sugars, bread, pasta, rice, ice cream  -Drink 6-8 glasses of water daily  -Moderate exercise 30 min 5 times per week or 75 min intense exercise  -Start 8-10 hour intermittent fasting diet   -Avoid eating 3 hours prior to bedtime  -Reduce alcohol to 1 glass per day  --Avoid NSAIDs

## 2023-01-18 NOTE — TELEPHONE ENCOUNTER
----- Message from Lynne Vaughan sent at 1/18/2023  2:57 PM CST -----  Contact: MEKHI ODOM [[id number]]@ [phone]  Please call patient to let her know how she will be getting the Feca test. And to clarify what test it is.

## 2023-01-18 NOTE — ASSESSMENT & PLAN NOTE
-stable on Hyzaar 100/12.5 mg po daily  -get routine labs today    -Check Bps at home weekly and prn symptoms for goal BP <130/80.  -Start healthy diet high in fiber (25-35 mg daily), low fat dairy, lean protein, low in saturated/trans fat; high in calcium/magnesium/potassium; 1.5 gm sodium diet; low in processed sugars and foods, ie avoid WHITE sugars, bread, pasta, rice, ice cream  -Drink 6-8 glasses of water daily  -Moderate exercise 30 min 5 times per week or 75 min intense exercise  -Start 8-10 hour intermittent fasting diet   -Avoid eating 3 hours prior to bedtime  -Reduce alcohol to 1-2 glasses per day  -Avoid smoking, vaping   -Avoid NSAIDs

## 2023-01-31 ENCOUNTER — LAB VISIT (OUTPATIENT)
Dept: LAB | Facility: HOSPITAL | Age: 73
End: 2023-01-31
Attending: INTERNAL MEDICINE
Payer: MEDICARE

## 2023-01-31 DIAGNOSIS — Z12.11 SCREENING FOR MALIGNANT NEOPLASM OF COLON: ICD-10-CM

## 2023-01-31 LAB — HEMOCCULT STL QL IA: NEGATIVE

## 2023-01-31 PROCEDURE — 82274 ASSAY TEST FOR BLOOD FECAL: CPT | Performed by: INTERNAL MEDICINE

## 2023-04-24 PROBLEM — Z00.00 PREVENTATIVE HEALTH CARE: Status: RESOLVED | Noted: 2019-05-23 | Resolved: 2023-04-24

## 2023-08-07 DIAGNOSIS — Z12.31 ENCOUNTER FOR SCREENING MAMMOGRAM FOR MALIGNANT NEOPLASM OF BREAST: Primary | ICD-10-CM

## 2023-09-20 DIAGNOSIS — Z78.0 MENOPAUSE: ICD-10-CM

## 2023-10-05 NOTE — PATIENT INSTRUCTIONS
The 10-year CVD risk score (Lizandro et al., 2008) is: 19%    Values used to calculate the score:      Age: 69 years      Sex: Female      Diabetic: No      Tobacco smoker: No      Systolic Blood Pressure: 164 mmHg      Is BP treated: Yes      HDL Cholesterol: 134 mg/dL      Total Cholesterol: 282 mg/dL   none

## 2023-10-16 ENCOUNTER — HOSPITAL ENCOUNTER (OUTPATIENT)
Dept: RADIOLOGY | Facility: HOSPITAL | Age: 73
Discharge: HOME | End: 2023-10-16
Attending: INTERNAL MEDICINE
Payer: MEDICARE

## 2023-10-16 DIAGNOSIS — Z78.0 MENOPAUSE: ICD-10-CM

## 2023-10-16 PROCEDURE — 77080 DXA BONE DENSITY AXIAL: CPT | Mod: 26,,, | Performed by: INTERNAL MEDICINE

## 2023-10-16 PROCEDURE — 77080 DXA BONE DENSITY AXIAL SKELETON 1 OR MORE SITES: ICD-10-PCS | Mod: 26,,, | Performed by: INTERNAL MEDICINE

## 2023-10-16 PROCEDURE — 77080 DXA BONE DENSITY AXIAL: CPT | Mod: TC

## 2023-10-21 ENCOUNTER — PATIENT MESSAGE (OUTPATIENT)
Dept: ADMINISTRATIVE | Facility: OTHER | Age: 73
End: 2023-10-21
Payer: MEDICARE

## 2023-11-21 ENCOUNTER — HOSPITAL ENCOUNTER (OUTPATIENT)
Dept: RADIOLOGY | Facility: HOSPITAL | Age: 73
Discharge: HOME | End: 2023-11-21
Attending: INTERNAL MEDICINE
Payer: MEDICARE

## 2023-11-21 VITALS — BODY MASS INDEX: 18.51 KG/M2 | HEIGHT: 69 IN | WEIGHT: 125 LBS

## 2023-11-21 DIAGNOSIS — Z12.31 ENCOUNTER FOR SCREENING MAMMOGRAM FOR MALIGNANT NEOPLASM OF BREAST: ICD-10-CM

## 2023-11-21 PROCEDURE — 77067 SCR MAMMO BI INCL CAD: CPT | Mod: 26,,, | Performed by: RADIOLOGY

## 2023-11-21 PROCEDURE — 77067 MAMMO DIGITAL SCREENING BILAT WITH TOMO: ICD-10-PCS | Mod: 26,,, | Performed by: RADIOLOGY

## 2023-11-21 PROCEDURE — 77067 SCR MAMMO BI INCL CAD: CPT | Mod: TC

## 2023-11-21 PROCEDURE — 77063 BREAST TOMOSYNTHESIS BI: CPT | Mod: 26,,, | Performed by: RADIOLOGY

## 2023-11-21 PROCEDURE — 77063 MAMMO DIGITAL SCREENING BILAT WITH TOMO: ICD-10-PCS | Mod: 26,,, | Performed by: RADIOLOGY

## 2024-01-19 ENCOUNTER — OFFICE VISIT (OUTPATIENT)
Dept: PRIMARY CARE CLINIC | Facility: CLINIC | Age: 74
End: 2024-01-19
Payer: MEDICARE

## 2024-01-19 ENCOUNTER — LAB VISIT (OUTPATIENT)
Dept: LAB | Facility: HOSPITAL | Age: 74
End: 2024-01-19
Attending: INTERNAL MEDICINE
Payer: MEDICARE

## 2024-01-19 VITALS
WEIGHT: 128.31 LBS | BODY MASS INDEX: 19 KG/M2 | SYSTOLIC BLOOD PRESSURE: 120 MMHG | OXYGEN SATURATION: 96 % | DIASTOLIC BLOOD PRESSURE: 74 MMHG | HEIGHT: 69 IN | HEART RATE: 86 BPM

## 2024-01-19 DIAGNOSIS — G43.109 MIGRAINE WITH AURA AND WITHOUT STATUS MIGRAINOSUS, NOT INTRACTABLE: ICD-10-CM

## 2024-01-19 DIAGNOSIS — I10 ESSENTIAL HYPERTENSION: ICD-10-CM

## 2024-01-19 DIAGNOSIS — E78.5 HYPERLIPIDEMIA, UNSPECIFIED HYPERLIPIDEMIA TYPE: ICD-10-CM

## 2024-01-19 DIAGNOSIS — M19.042 PRIMARY OSTEOARTHRITIS OF BOTH HANDS: ICD-10-CM

## 2024-01-19 DIAGNOSIS — E55.9 VITAMIN D DEFICIENCY: ICD-10-CM

## 2024-01-19 DIAGNOSIS — I73.00 RAYNAUD'S DISEASE WITHOUT GANGRENE: ICD-10-CM

## 2024-01-19 DIAGNOSIS — Z00.00 PREVENTATIVE HEALTH CARE: ICD-10-CM

## 2024-01-19 DIAGNOSIS — M19.041 PRIMARY OSTEOARTHRITIS OF BOTH HANDS: ICD-10-CM

## 2024-01-19 DIAGNOSIS — I10 HYPERTENSION, UNSPECIFIED TYPE: ICD-10-CM

## 2024-01-19 DIAGNOSIS — Z00.00 PREVENTATIVE HEALTH CARE: Primary | ICD-10-CM

## 2024-01-19 LAB
25(OH)D3+25(OH)D2 SERPL-MCNC: 64 NG/ML (ref 30–96)
ALBUMIN SERPL BCP-MCNC: 4.2 G/DL (ref 3.5–5.2)
ALP SERPL-CCNC: 56 U/L (ref 55–135)
ALT SERPL W/O P-5'-P-CCNC: 17 U/L (ref 10–44)
ANION GAP SERPL CALC-SCNC: 11 MMOL/L (ref 8–16)
AST SERPL-CCNC: 33 U/L (ref 10–40)
BASOPHILS # BLD AUTO: 0.02 K/UL (ref 0–0.2)
BASOPHILS NFR BLD: 0.5 % (ref 0–1.9)
BILIRUB SERPL-MCNC: 0.6 MG/DL (ref 0.1–1)
BUN SERPL-MCNC: 26 MG/DL (ref 8–23)
CALCIUM SERPL-MCNC: 10.4 MG/DL (ref 8.7–10.5)
CHLORIDE SERPL-SCNC: 102 MMOL/L (ref 95–110)
CHOLEST SERPL-MCNC: 294 MG/DL (ref 120–199)
CHOLEST/HDLC SERPL: 2.2 {RATIO} (ref 2–5)
CO2 SERPL-SCNC: 28 MMOL/L (ref 23–29)
CREAT SERPL-MCNC: 0.9 MG/DL (ref 0.5–1.4)
DIFFERENTIAL METHOD BLD: ABNORMAL
EOSINOPHIL # BLD AUTO: 0.1 K/UL (ref 0–0.5)
EOSINOPHIL NFR BLD: 3 % (ref 0–8)
ERYTHROCYTE [DISTWIDTH] IN BLOOD BY AUTOMATED COUNT: 12.4 % (ref 11.5–14.5)
EST. GFR  (NO RACE VARIABLE): >60 ML/MIN/1.73 M^2
GLUCOSE SERPL-MCNC: 85 MG/DL (ref 70–110)
HCT VFR BLD AUTO: 40.2 % (ref 37–48.5)
HDLC SERPL-MCNC: 135 MG/DL (ref 40–75)
HDLC SERPL: 45.9 % (ref 20–50)
HGB BLD-MCNC: 13.1 G/DL (ref 12–16)
IMM GRANULOCYTES # BLD AUTO: 0.02 K/UL (ref 0–0.04)
IMM GRANULOCYTES NFR BLD AUTO: 0.5 % (ref 0–0.5)
LDLC SERPL CALC-MCNC: 147.4 MG/DL (ref 63–159)
LYMPHOCYTES # BLD AUTO: 1.4 K/UL (ref 1–4.8)
LYMPHOCYTES NFR BLD: 34.8 % (ref 18–48)
MAGNESIUM SERPL-MCNC: 2 MG/DL (ref 1.6–2.6)
MCH RBC QN AUTO: 32.8 PG (ref 27–31)
MCHC RBC AUTO-ENTMCNC: 32.6 G/DL (ref 32–36)
MCV RBC AUTO: 101 FL (ref 82–98)
MONOCYTES # BLD AUTO: 0.4 K/UL (ref 0.3–1)
MONOCYTES NFR BLD: 8.9 % (ref 4–15)
NEUTROPHILS # BLD AUTO: 2.1 K/UL (ref 1.8–7.7)
NEUTROPHILS NFR BLD: 52.3 % (ref 38–73)
NONHDLC SERPL-MCNC: 159 MG/DL
NRBC BLD-RTO: 0 /100 WBC
PLATELET # BLD AUTO: 209 K/UL (ref 150–450)
PMV BLD AUTO: 10.9 FL (ref 9.2–12.9)
POTASSIUM SERPL-SCNC: 5 MMOL/L (ref 3.5–5.1)
PROT SERPL-MCNC: 7.7 G/DL (ref 6–8.4)
RBC # BLD AUTO: 3.99 M/UL (ref 4–5.4)
SODIUM SERPL-SCNC: 141 MMOL/L (ref 136–145)
TRIGL SERPL-MCNC: 58 MG/DL (ref 30–150)
TSH SERPL DL<=0.005 MIU/L-ACNC: 0.89 UIU/ML (ref 0.4–4)
WBC # BLD AUTO: 4.05 K/UL (ref 3.9–12.7)

## 2024-01-19 PROCEDURE — 84443 ASSAY THYROID STIM HORMONE: CPT | Performed by: INTERNAL MEDICINE

## 2024-01-19 PROCEDURE — 80053 COMPREHEN METABOLIC PANEL: CPT | Performed by: INTERNAL MEDICINE

## 2024-01-19 PROCEDURE — 36415 COLL VENOUS BLD VENIPUNCTURE: CPT | Mod: PN | Performed by: INTERNAL MEDICINE

## 2024-01-19 PROCEDURE — 99214 OFFICE O/P EST MOD 30 MIN: CPT | Mod: PBBFAC,PN | Performed by: INTERNAL MEDICINE

## 2024-01-19 PROCEDURE — 82306 VITAMIN D 25 HYDROXY: CPT | Performed by: INTERNAL MEDICINE

## 2024-01-19 PROCEDURE — 85025 COMPLETE CBC W/AUTO DIFF WBC: CPT | Performed by: INTERNAL MEDICINE

## 2024-01-19 PROCEDURE — 99999 PR PBB SHADOW E&M-EST. PATIENT-LVL IV: CPT | Mod: PBBFAC,,, | Performed by: INTERNAL MEDICINE

## 2024-01-19 PROCEDURE — 80061 LIPID PANEL: CPT | Performed by: INTERNAL MEDICINE

## 2024-01-19 PROCEDURE — 83735 ASSAY OF MAGNESIUM: CPT | Performed by: INTERNAL MEDICINE

## 2024-01-19 PROCEDURE — 99397 PER PM REEVAL EST PAT 65+ YR: CPT | Mod: S$PBB,GZ,, | Performed by: INTERNAL MEDICINE

## 2024-01-19 NOTE — ASSESSMENT & PLAN NOTE
controlled on  Losartan/HCTZ 100/12.5 mg daily  -Check Bps at home weekly and prn symptoms for goal BP <130/80.  Avoid Pierce's table salt; use Mrs. Quintanilla or Ramon Noyola no salt   -Start healthy diet high in fiber (25-35 gm daily), low fat dairy, lean protein, low in saturated/trans fat (minimize cheese, creamy salad dressings); high in calcium/magnesium/potassium; 1.5 gm sodium diet; low in processed sugars and foods, ie  WHITE sugars, bread, pasta, rice, ice cream, cookies, cake, doughnuts  -Drink 6-8 glasses of water daily  -Moderate exercise 30 min 5 times per week or 75 min intense exercise biweekly   -Start 8-10 hour intermittent fasting diet   -Avoid eating 3 hours prior to bedtime  -Reduce alcohol to 1-2 glasses per day  -Avoid smoking, vaping, stimulant drugs/mediations ie illicit drugs, pseudo-ephedrine  -Use ADD/ADHD meds sparingly  -Minimize NSAIDs

## 2024-01-19 NOTE — PATIENT INSTRUCTIONS
Consider osteo-biflex twice per day    Get COVID, flu vaccines, Shingrex     Stool FIT    Referral for GYN at [address]     Labs today     -Check Bps at home weekly and prn symptoms for goal BP <130/80.  Avoid Pierce's table salt; use Mrs. Quintanilla or Ramon Noyola no salt   -Start healthy diet high in fiber (25-35 gm daily), low fat dairy, lean protein, low in saturated/trans fat (minimize cheese, creamy salad dressings); high in calcium/magnesium/potassium; 1.5 gm sodium diet; low in processed sugars and foods, ie  WHITE sugars, bread, pasta, rice, ice cream, cookies, cake, doughnuts  -Drink 6-8 glasses of water daily  -Moderate exercise 30 min 5 times per week or 75 min intense exercise biweekly   -Start 8-10 hour intermittent fasting diet   -Avoid eating 3 hours prior to bedtime  -Reduce alcohol to 1-2 glasses per day  --Minimize NSAIDs

## 2024-01-19 NOTE — PROGRESS NOTES
[facility]/Pediatric[facility] Progress Note      Chief Complaint     Annual Exam       Subjective:      History of Present Illness:  Stephanie Perez is a 73 y.o. female here for annual PE    Will be great-grandmother in 2024 in [place], [place]    HTN dx'ed in 2021; controlled on  Losartan/HCTZ 100/12.5 mg daily for better BP control  - goes to gym bi-weekly and lifts weights; rides bike at home   -Has been checking Bps at home usually 120/70s.  Active, cuts her own grass; does all ADLs independently; denies SOB, MOTTA, CP  -takes OTC Mag and K  -weight is stable     OA hands: takes chondroitin sulfate 2 tabs daily but still painful with difficulty opening lids     Raynaud's: stable; wears gloves to keep hands warm     Migraine with aura: takes Imitrex prn - takes imitrex 50mg prn heat and exertion; only takes annually; saw neurologist, Esther GONZALEZ def: DEXA normal; check level today     SH: lives alone since ronnell  in  from colon cancer; has son who lives 2 doors down; 5oz wine pm, tobacco, drugs  FH: mom breast and lung cancer but smoked; PGM: breast cancer; sister Gretchen s/p double mastectomy at ; dad  of lymphoma; 2 sisters with Grave's; 2 sisters with Raynaud's  .    Past Medical History:  Past Medical History:   Diagnosis Date    Essential hypertension 2019    Hyperlipidemia 2020    Labile blood pressure 2019       Past Surgical History:  No past surgical history on file.    Allergies:  Review of patient's allergies indicates:  No Known Allergies    Home Medications:  Current Outpatient Medications   Medication Sig Dispense Refill    chondroitin sulfate A (CHONDROITIN SULFATE ORAL) Take by mouth.      losartan-hydrochlorothiazide 100-12.5 mg (HYZAAR) 100-12.5 mg Tab TAKE 1 TABLET BY MOUTH EVERY DAY 90 tablet 1    magnesium oxide (MAG-OX) 400 mg (241.3 mg magnesium) tablet Take 400 mg by mouth once daily.      multivitamin (THERAGRAN) per tablet Take 1 tablet by  "mouth once daily.      omega-3 fatty acids/fish oil (FISH OIL-OMEGA-3 FATTY ACIDS) 300-1,000 mg capsule Take 2 capsules by mouth once daily.      sumatriptan (IMITREX) 50 MG tablet Take 1/2 to 1 tab at onset of headache.  If no improvement in 2 hours, take another.  Do not take more than 2 in 24 hours. 9 tablet 11     No current facility-administered medications for this visit.        Family History:  Family History   Problem Relation Age of Onset    Breast cancer Mother         dcis    Breast cancer Sister 50        dcis    Breast cancer Paternal Grandmother     Breast cancer Sister 63       Social History:  Social History     Tobacco Use    Smoking status: Former    Smokeless tobacco: Never   Substance Use Topics    Alcohol use: Not Currently    Drug use: Never       Review of Systems:  Pertinent positives and negatives listed in HPI. All other systems are reviewed and are negative.    Health Maintaince :   Health Maintenance Topics with due status: Not Due       Topic Last Completion Date    DEXA Scan 10/16/2023    Mammogram 11/21/2023    Lipid Panel 01/19/2024           Eye:  UTD  Dental: Jan 30, 2024    Immunizations:   Tdap: n/a.  Influenza: refuses.  Pneumovax: UTD  Shingrex x 2: needs  COVID: refuses  Hepatitis C:   Cancer Screening:  PAP: UTD   Mammogram: UTD 11/2023  Colonoscopy: stool FIT 1/31/2023  DEXA:  10/2023 normal   LDCT: n/a    The ASCVD Risk score (Anne DK, et al., 2019) failed to calculate for the following reasons:    The valid HDL cholesterol range is 20 to 100 mg/dL      Objective:   /74 (BP Location: Right arm, Patient Position: Sitting, BP Method: Medium (Manual))   Pulse 86   Ht 5' 9" (1.753 m)   Wt 58.2 kg (128 lb 4.9 oz)   LMP 01/01/1980 Comment: tubal 1980  SpO2 96%   BMI 18.95 kg/m²      Body mass index is 18.95 kg/m².       Physical Examination:  General: Alert and awake in no apparent distress  HEENT: Normocephalic and atraumatic; Tms WNL  Eyes:  PERRL; EOMi with " anicteric sclera and clear conjunctivae  Mouth:  Oropharynx clear and without exudate; moist mucous membranes  Neck:   Cervical nodes not enlarged; supple; no bruits  Cardio:  Regular rate and rhythm with normal S1 and S2; no murmurs or rubs  Resp:  CTAB; respirations unlabored; no wheezes, crackles or rhonchi  Abdom: Soft, NTND with normoactive bowel sounds; negative HSM  Extrem: Warm and well-perfused with no clubbing, cyanosis or edema; heberdon and jaquan   Skin:  No rashes, lesions, or color changes  Pulses:  2+ and symmetric distally  Neuro:  AAOx3; cooperative and pleasant with no focal deficits    Laboratory:      Most Recent Data:  Lab Results   Component Value Date    WBC 4.05 01/19/2024    HGB 13.1 01/19/2024    HCT 40.2 01/19/2024     01/19/2024    CHOL 294 (H) 01/19/2024    TRIG 58 01/19/2024     (H) 01/19/2024    ALT 17 01/19/2024    AST 33 01/19/2024     01/19/2024    K 5.0 01/19/2024     01/19/2024    BUN 26 (H) 01/19/2024    CO2 28 01/19/2024    TSH 0.891 01/19/2024              CBC:   WBC   Date Value Ref Range Status   01/19/2024 4.05 3.90 - 12.70 K/uL Final     Hemoglobin   Date Value Ref Range Status   01/19/2024 13.1 12.0 - 16.0 g/dL Final     Hematocrit   Date Value Ref Range Status   01/19/2024 40.2 37.0 - 48.5 % Final     Platelets   Date Value Ref Range Status   01/19/2024 209 150 - 450 K/uL Final     MCV   Date Value Ref Range Status   01/19/2024 101 (H) 82 - 98 fL Final     RDW   Date Value Ref Range Status   01/19/2024 12.4 11.5 - 14.5 % Final     BMP:   Sodium   Date Value Ref Range Status   01/19/2024 141 136 - 145 mmol/L Final     Potassium   Date Value Ref Range Status   01/19/2024 5.0 3.5 - 5.1 mmol/L Final     Chloride   Date Value Ref Range Status   01/19/2024 102 95 - 110 mmol/L Final     CO2   Date Value Ref Range Status   01/19/2024 28 23 - 29 mmol/L Final     BUN   Date Value Ref Range Status   01/19/2024 26 (H) 8 - 23 mg/dL Final     Creatinine  "  Date Value Ref Range Status   01/19/2024 0.9 0.5 - 1.4 mg/dL Final     Glucose   Date Value Ref Range Status   01/19/2024 85 70 - 110 mg/dL Final     Calcium   Date Value Ref Range Status   01/19/2024 10.4 8.7 - 10.5 mg/dL Final     Magnesium   Date Value Ref Range Status   01/19/2024 2.0 1.6 - 2.6 mg/dL Final     LFTs:   Total Protein   Date Value Ref Range Status   01/19/2024 7.7 6.0 - 8.4 g/dL Final     Albumin   Date Value Ref Range Status   01/19/2024 4.2 3.5 - 5.2 g/dL Final     Total Bilirubin   Date Value Ref Range Status   01/19/2024 0.6 0.1 - 1.0 mg/dL Final     Comment:     For infants and newborns, interpretation of results should be based  on gestational age, weight and in agreement with clinical  observations.    Premature Infant recommended reference ranges:  Up to 24 hours.............<8.0 mg/dL  Up to 48 hours............<12.0 mg/dL  3-5 days..................<15.0 mg/dL  6-29 days.................<15.0 mg/dL       AST   Date Value Ref Range Status   01/19/2024 33 10 - 40 U/L Final     Alkaline Phosphatase   Date Value Ref Range Status   01/19/2024 56 55 - 135 U/L Final     ALT   Date Value Ref Range Status   01/19/2024 17 10 - 44 U/L Final     Coags: No results found for: "INR", "PROTIME", "PTT"  FLP:      Lab Results   Component Value Date    CHOL 294 (H) 01/19/2024    CHOL 276 (H) 06/04/2020    CHOL 282 (H) 02/12/2019     Lab Results   Component Value Date     (H) 01/19/2024    HDL >140 (H) 06/04/2020     (H) 02/12/2019     Lab Results   Component Value Date    LDLCALC 147.4 01/19/2024    LDLCALC Unable to calculate 06/04/2020    LDLCALC 138.4 02/12/2019     Lab Results   Component Value Date    TRIG 58 01/19/2024    TRIG 44 06/04/2020    TRIG 48 02/12/2019     Lab Results   Component Value Date    CHOLHDL 45.9 01/19/2024    CHOLHDL Unable to calculate 06/04/2020    CHOLHDL 47.5 02/12/2019      DM:      LDL Cholesterol   Date Value Ref Range Status   01/19/2024 147.4 63.0 - 159.0 " "mg/dL Final     Comment:     The National Cholesterol Education Program (NCEP) has set the  following guidelines (reference values) for LDL Cholesterol:  Optimal.......................<130 mg/dL  Borderline High...............130-159 mg/dL  High..........................160-189 mg/dL  Very High.....................>190 mg/dL       Creatinine   Date Value Ref Range Status   01/19/2024 0.9 0.5 - 1.4 mg/dL Final     Thyroid:   TSH   Date Value Ref Range Status   01/19/2024 0.891 0.400 - 4.000 uIU/mL Final     Anemia: No results found for: "IRON", "TIBC", "FERRITIN", "[id number]", "FOLATE"  Cardiac: No results found for: "TROPONINI", "CKTOTAL", "CKMB", "BNP"  Urinalysis:   Color, UA   Date Value Ref Range Status   01/19/2024 Yellow Yellow, Straw, Lashae Final     Specific Gravity, UA   Date Value Ref Range Status   01/19/2024 1.020 1.005 - 1.030 Final     Nitrite, UA   Date Value Ref Range Status   01/19/2024 Negative Negative Final     Ketones, UA   Date Value Ref Range Status   01/19/2024 Negative Negative Final       Other Results:  EKG (my interpretation):     Radiology:  Mammo Digital Screening Bilat w/ Marcellus  Narrative: Result:  Mammo Digital Screening Bilat w/ Marcellus    History:  Patient is 73 y.o. and is seen for a screening mammogram.    Films Compared:  Prior images (if available) were compared.     Findings:  This procedure was performed using tomosynthesis.   Computer-aided detection was utilized in the interpretation of this   examination.    The breasts are heterogeneously dense, which may obscure small masses.   There is no evidence of suspicious masses, microcalcifications or   architectural distortion.  Impression:    No mammographic evidence of malignancy.    BI-RADS Category 1: Negative    Recommendation:  Routine screening mammogram in 1 year is recommended.    Your estimated lifetime risk of breast cancer (to age 85) based on   Tyrer-Cuzick risk assessment model is 6.84 %.  According to the American "   Cancer Society, patients with a lifetime breast cancer risk of 20% or   higher might benefit from supplemental screening tests, such as screening   breast MRI.          Assessment/Plan     Stephanie Perez is a 73 y.o. female with:  1. Preventative health care  Assessment & Plan:  Refer to GYN for routine exam  Get Shingrex  Order stool FIT  Labs today    Orders:  -     Ambulatory referral/consult to Gynecology; Future; Expected date: 01/19/2024  -     Urinalysis; Future; Expected date: 01/19/2024  -     Comprehensive Metabolic Panel; Future; Expected date: 01/19/2024  -     Fecal Immunochemical Test (iFOBT); Future; Expected date: 01/19/2024    2. Vitamin D deficiency  Assessment & Plan:  Check Vit D level today    Orders:  -     Vitamin D; Future; Expected date: 01/19/2024    3. Hypertension, unspecified type  -     TSH; Future; Expected date: 01/19/2024  -     Microalbumin/Creatinine Ratio, Urine; Future; Expected date: 01/19/2024  -     Magnesium; Future; Expected date: 01/19/2024    4. Hyperlipidemia, unspecified hyperlipidemia type  -     Lipid Panel; Future; Expected date: 01/19/2024  -     CBC Auto Differential; Future; Expected date: 01/19/2024    5. Primary osteoarthritis of both hands  Assessment & Plan:  Consider osteo-biflex twice per day  Can take Tylenol  bid prn      6. Essential hypertension  Assessment & Plan:  controlled on  Losartan/HCTZ 100/12.5 mg daily  -Check Bps at home weekly and prn symptoms for goal BP <130/80.  Avoid Pierce's table salt; use Mrs. Quintanilla or Ramon Noyola no salt   -Start healthy diet high in fiber (25-35 gm daily), low fat dairy, lean protein, low in saturated/trans fat (minimize cheese, creamy salad dressings); high in calcium/magnesium/potassium; 1.5 gm sodium diet; low in processed sugars and foods, ie  WHITE sugars, bread, pasta, rice, ice cream, cookies, cake, doughnuts  -Drink 6-8 glasses of water daily  -Moderate exercise 30 min 5 times per week or 75 min intense  exercise biweekly   -Start 8-10 hour intermittent fasting diet   -Avoid eating 3 hours prior to bedtime  -Reduce alcohol to 1-2 glasses per day  -Avoid smoking, vaping, stimulant drugs/mediations ie illicit drugs, pseudo-ephedrine  -Use ADD/ADHD meds sparingly  -Minimize NSAIDs          7. Raynaud's disease without gangrene  Assessment & Plan:  Wear gloves to keep hands warm      8. Migraine with aura and without status migrainosus, not intractable  Overview:  neurologist, Esther Vargas    Assessment & Plan:  Cont prn Imitrex 50mg but avoid triggers ie heat with exertion               I spent a total of 25 minutes on the day of the visit.This includes face to face time and non-face to face time preparing to see the patient (eg, review of tests), obtaining and/or reviewing separately obtained history, documenting clinical information in the electronic or other health record, independently interpreting results and communicating results to the patient/family/caregiver, or care coordinator.   Code Status:     Vanessa Sauceda MD

## 2024-04-22 PROBLEM — Z00.00 PREVENTATIVE HEALTH CARE: Status: RESOLVED | Noted: 2019-05-23 | Resolved: 2024-04-22

## 2024-06-06 ENCOUNTER — LAB VISIT (OUTPATIENT)
Dept: LAB | Facility: HOSPITAL | Age: 74
End: 2024-06-06
Attending: INTERNAL MEDICINE
Payer: MEDICARE

## 2024-06-06 DIAGNOSIS — Z00.00 PREVENTATIVE HEALTH CARE: ICD-10-CM

## 2024-06-06 PROCEDURE — 82274 ASSAY TEST FOR BLOOD FECAL: CPT | Performed by: INTERNAL MEDICINE

## 2024-06-10 DIAGNOSIS — I10 ESSENTIAL HYPERTENSION: ICD-10-CM

## 2024-06-10 RX ORDER — LOSARTAN POTASSIUM AND HYDROCHLOROTHIAZIDE 12.5; 1 MG/1; MG/1
1 TABLET ORAL DAILY
Qty: 90 TABLET | Refills: 3 | Status: SHIPPED | OUTPATIENT
Start: 2024-06-10

## 2024-06-11 LAB — HEMOCCULT STL QL IA: NEGATIVE

## 2024-11-22 ENCOUNTER — HOSPITAL ENCOUNTER (OUTPATIENT)
Dept: RADIOLOGY | Facility: HOSPITAL | Age: 74
Discharge: HOME | End: 2024-11-22
Attending: INTERNAL MEDICINE
Payer: MEDICARE

## 2024-11-22 DIAGNOSIS — Z12.31 ENCOUNTER FOR SCREENING MAMMOGRAM FOR BREAST CANCER: ICD-10-CM

## 2024-11-22 PROCEDURE — 77063 BREAST TOMOSYNTHESIS BI: CPT | Mod: TC

## 2024-11-22 PROCEDURE — 77067 SCR MAMMO BI INCL CAD: CPT | Mod: 26,,, | Performed by: RADIOLOGY

## 2024-11-22 PROCEDURE — 77063 BREAST TOMOSYNTHESIS BI: CPT | Mod: 26,,, | Performed by: RADIOLOGY

## 2024-11-27 ENCOUNTER — PATIENT MESSAGE (OUTPATIENT)
Dept: ADMINISTRATIVE | Facility: OTHER | Age: 74
End: 2024-11-27
Payer: MEDICARE

## 2024-12-04 ENCOUNTER — OFFICE VISIT (OUTPATIENT)
Dept: PRIMARY CARE CLINIC | Facility: CLINIC | Age: 74
End: 2024-12-04
Payer: MEDICARE

## 2024-12-04 ENCOUNTER — LAB VISIT (OUTPATIENT)
Dept: LAB | Facility: HOSPITAL | Age: 74
End: 2024-12-04
Attending: INTERNAL MEDICINE
Payer: MEDICARE

## 2024-12-04 VITALS
HEART RATE: 66 BPM | SYSTOLIC BLOOD PRESSURE: 124 MMHG | WEIGHT: 121.69 LBS | HEIGHT: 69 IN | DIASTOLIC BLOOD PRESSURE: 70 MMHG | BODY MASS INDEX: 18.02 KG/M2

## 2024-12-04 DIAGNOSIS — R19.7 ACUTE DIARRHEA: Primary | ICD-10-CM

## 2024-12-04 DIAGNOSIS — I10 ESSENTIAL HYPERTENSION: ICD-10-CM

## 2024-12-04 DIAGNOSIS — R19.7 ACUTE DIARRHEA: ICD-10-CM

## 2024-12-04 LAB
ALBUMIN SERPL BCP-MCNC: 4.2 G/DL (ref 3.5–5.2)
ALP SERPL-CCNC: 64 U/L (ref 40–150)
ALT SERPL W/O P-5'-P-CCNC: 25 U/L (ref 10–44)
ANION GAP SERPL CALC-SCNC: 14 MMOL/L (ref 8–16)
AST SERPL-CCNC: 39 U/L (ref 10–40)
BASOPHILS # BLD AUTO: 0.05 K/UL (ref 0–0.2)
BASOPHILS NFR BLD: 0.9 % (ref 0–1.9)
BILIRUB SERPL-MCNC: 0.8 MG/DL (ref 0.1–1)
BUN SERPL-MCNC: 22 MG/DL (ref 8–23)
CALCIUM SERPL-MCNC: 10.6 MG/DL (ref 8.7–10.5)
CHLORIDE SERPL-SCNC: 104 MMOL/L (ref 95–110)
CO2 SERPL-SCNC: 25 MMOL/L (ref 23–29)
CREAT SERPL-MCNC: 1 MG/DL (ref 0.5–1.4)
DIFFERENTIAL METHOD BLD: ABNORMAL
EOSINOPHIL # BLD AUTO: 0.1 K/UL (ref 0–0.5)
EOSINOPHIL NFR BLD: 0.9 % (ref 0–8)
ERYTHROCYTE [DISTWIDTH] IN BLOOD BY AUTOMATED COUNT: 13 % (ref 11.5–14.5)
EST. GFR  (NO RACE VARIABLE): 59.1 ML/MIN/1.73 M^2
GLUCOSE SERPL-MCNC: 75 MG/DL (ref 70–110)
HCT VFR BLD AUTO: 42.2 % (ref 37–48.5)
HGB BLD-MCNC: 13.8 G/DL (ref 12–16)
IMM GRANULOCYTES # BLD AUTO: 0.02 K/UL (ref 0–0.04)
IMM GRANULOCYTES NFR BLD AUTO: 0.3 % (ref 0–0.5)
LYMPHOCYTES # BLD AUTO: 1.7 K/UL (ref 1–4.8)
LYMPHOCYTES NFR BLD: 28.4 % (ref 18–48)
MAGNESIUM SERPL-MCNC: 1.8 MG/DL (ref 1.6–2.6)
MCH RBC QN AUTO: 33.7 PG (ref 27–31)
MCHC RBC AUTO-ENTMCNC: 32.7 G/DL (ref 32–36)
MCV RBC AUTO: 103 FL (ref 82–98)
MONOCYTES # BLD AUTO: 0.6 K/UL (ref 0.3–1)
MONOCYTES NFR BLD: 10.5 % (ref 4–15)
NEUTROPHILS # BLD AUTO: 3.5 K/UL (ref 1.8–7.7)
NEUTROPHILS NFR BLD: 59 % (ref 38–73)
NRBC BLD-RTO: 0 /100 WBC
PLATELET # BLD AUTO: 228 K/UL (ref 150–450)
PMV BLD AUTO: 10.6 FL (ref 9.2–12.9)
POTASSIUM SERPL-SCNC: 4.4 MMOL/L (ref 3.5–5.1)
PROT SERPL-MCNC: 7.9 G/DL (ref 6–8.4)
RBC # BLD AUTO: 4.1 M/UL (ref 4–5.4)
SODIUM SERPL-SCNC: 143 MMOL/L (ref 136–145)
WBC # BLD AUTO: 5.88 K/UL (ref 3.9–12.7)

## 2024-12-04 PROCEDURE — 36415 COLL VENOUS BLD VENIPUNCTURE: CPT | Mod: PN | Performed by: INTERNAL MEDICINE

## 2024-12-04 PROCEDURE — 99213 OFFICE O/P EST LOW 20 MIN: CPT | Mod: PBBFAC,PN | Performed by: INTERNAL MEDICINE

## 2024-12-04 PROCEDURE — 80053 COMPREHEN METABOLIC PANEL: CPT | Performed by: INTERNAL MEDICINE

## 2024-12-04 PROCEDURE — 83735 ASSAY OF MAGNESIUM: CPT | Performed by: INTERNAL MEDICINE

## 2024-12-04 PROCEDURE — 85025 COMPLETE CBC W/AUTO DIFF WBC: CPT | Performed by: INTERNAL MEDICINE

## 2024-12-04 PROCEDURE — 99214 OFFICE O/P EST MOD 30 MIN: CPT | Mod: S$PBB,,, | Performed by: INTERNAL MEDICINE

## 2024-12-04 PROCEDURE — G2211 COMPLEX E/M VISIT ADD ON: HCPCS | Mod: S$PBB,,, | Performed by: INTERNAL MEDICINE

## 2024-12-04 PROCEDURE — 99999 PR PBB SHADOW E&M-EST. PATIENT-LVL III: CPT | Mod: PBBFAC,,, | Performed by: INTERNAL MEDICINE

## 2024-12-04 RX ORDER — ALUMINUM ZIRCONIUM OCTACHLOROHYDREX GLY 16 G/100G
1 GEL TOPICAL DAILY
Qty: 30 CAPSULE | Refills: 5 | Status: SHIPPED | OUTPATIENT
Start: 2024-12-04

## 2024-12-04 NOTE — ASSESSMENT & PLAN NOTE
Check stool studies and electrolyges  Start Align 4mg daily   Eat yogurt   Keep appt with Dr. Garcia   Drink Pedialyte after each stool

## 2024-12-04 NOTE — PROGRESS NOTES
[facility]      Chief Complaint     Diarrhea (X 1 month (Nov 3))      Subjective:      History of Present Illness:  Stephanie Perez is a 74 y.o. female     C/o 4 wks hx liquidy stools with mucous but no blood associated with crampy abdominal pain and bloating; denies fever, chills, traveling; improved with Imodium; has Bms approx 3 hours after she eats.   Has lost at least 3-4 lbs. Denies dizziness, weakness, light-headedness.      Past Medical History:  Past Medical History:   Diagnosis Date    Essential hypertension 02/12/2019    Hyperlipidemia 02/06/2020    Labile blood pressure 08/07/2019       Past Surgical History:  No past surgical history on file.    Allergies:  Review of patient's allergies indicates:  No Known Allergies    Home Medications:  Current Outpatient Medications   Medication Sig Dispense Refill    chondroitin sulfate A (CHONDROITIN SULFATE ORAL) Take by mouth.      losartan-hydrochlorothiazide 100-12.5 mg (HYZAAR) 100-12.5 mg Tab Take 1 tablet by mouth once daily. 90 tablet 3    magnesium oxide (MAG-OX) 400 mg (241.3 mg magnesium) tablet Take 400 mg by mouth once daily.      multivitamin (THERAGRAN) per tablet Take 1 tablet by mouth once daily.      omega-3 fatty acids/fish oil (FISH OIL-OMEGA-3 FATTY ACIDS) 300-1,000 mg capsule Take 2 capsules by mouth once daily.      sumatriptan (IMITREX) 50 MG tablet Take 1/2 to 1 tab at onset of headache.  If no improvement in 2 hours, take another.  Do not take more than 2 in 24 hours. 9 tablet 11    Bifidobacterium infantis (ALIGN) 4 mg Cap Take 1 capsule (4 mg total) by mouth once daily. 30 capsule 5     No current facility-administered medications for this visit.        Family History:  Family History   Problem Relation Name Age of Onset    Breast cancer Mother          dcis    Breast cancer Sister  50        dcis    Breast cancer Paternal Grandmother      Breast cancer Sister  63       Social History:  Social  "History     Tobacco Use    Smoking status: Former    Smokeless tobacco: Never   Substance Use Topics    Alcohol use: Not Currently    Drug use: Never       Review of Systems:  Pertinent positives and negatives listed in HPI. All other systems are reviewed and are negative.    Health Maintaince :   Health Maintenance Topics with due status: Not Due       Topic Last Completion Date    DEXA Scan 10/16/2023    Lipid Panel 01/19/2024    Colorectal Cancer Screening 06/06/2024    Mammogram 11/22/2024           Eye:   Dental:     Immunizations:     The ASCVD Risk score (Anne BOLIVAR, et al., 2019) failed to calculate for the following reasons:    The valid HDL cholesterol range is 20 to 100 mg/dL      Objective:   /70 (BP Location: Left arm, Patient Position: Sitting)   Pulse 66   Ht 5' 9" (1.753 m)   Wt 55.2 kg (121 lb 11.1 oz)   LMP 01/01/1980 Comment: tubal 1980  BMI 17.97 kg/m²      Body mass index is 17.97 kg/m².       Physical Examination:  General: Alert and awake in no apparent distress  HEENT: Normocephalic and atraumatic; Tms WNL  Eyes:  PERRL; EOMi with anicteric sclera and clear conjunctivae  Mouth:  Oropharynx clear and without exudate; moist mucous membranes  Neck:   Cervical nodes not enlarged (No submental, submandibular, preauricular, posterior auricular or occipital adenopathy); supple; no bruits  Cardio:  Regular rate and rhythm with normal S1 and S2; no murmurs or rubs  Resp:  CTAB; respirations unlabored; no wheezes, crackles or rhonchi  Abdom: Soft, NTND with normoactive bowel sounds; negative HSM  Extrem: Warm and well-perfused with no clubbing, cyanosis or edema  Skin:  No rashes, lesions, or color changes  Pulses:  2+ and symmetric distally  Neuro:  AAOx3; cooperative and pleasant with no focal deficits    Laboratory:      Most Recent Data:  Lab Results   Component Value Date    WBC 4.05 01/19/2024    HGB 13.1 01/19/2024    HCT 40.2 01/19/2024     01/19/2024    CHOL 294 (H) 01/19/2024 "    TRIG 58 01/19/2024     (H) 01/19/2024    ALT 17 01/19/2024    AST 33 01/19/2024     01/19/2024    K 5.0 01/19/2024     01/19/2024    BUN 26 (H) 01/19/2024    CO2 28 01/19/2024    TSH 0.891 01/19/2024              CBC:   WBC   Date Value Ref Range Status   01/19/2024 4.05 3.90 - 12.70 K/uL Final     Hemoglobin   Date Value Ref Range Status   01/19/2024 13.1 12.0 - 16.0 g/dL Final     Hematocrit   Date Value Ref Range Status   01/19/2024 40.2 37.0 - 48.5 % Final     Platelets   Date Value Ref Range Status   01/19/2024 209 150 - 450 K/uL Final     MCV   Date Value Ref Range Status   01/19/2024 101 (H) 82 - 98 fL Final     RDW   Date Value Ref Range Status   01/19/2024 12.4 11.5 - 14.5 % Final     BMP:   Sodium   Date Value Ref Range Status   01/19/2024 141 136 - 145 mmol/L Final     Potassium   Date Value Ref Range Status   01/19/2024 5.0 3.5 - 5.1 mmol/L Final     Chloride   Date Value Ref Range Status   01/19/2024 102 95 - 110 mmol/L Final     CO2   Date Value Ref Range Status   01/19/2024 28 23 - 29 mmol/L Final     BUN   Date Value Ref Range Status   01/19/2024 26 (H) 8 - 23 mg/dL Final     Creatinine   Date Value Ref Range Status   01/19/2024 0.9 0.5 - 1.4 mg/dL Final     Glucose   Date Value Ref Range Status   01/19/2024 85 70 - 110 mg/dL Final     Calcium   Date Value Ref Range Status   01/19/2024 10.4 8.7 - 10.5 mg/dL Final     Magnesium   Date Value Ref Range Status   01/19/2024 2.0 1.6 - 2.6 mg/dL Final     LFTs:   Total Protein   Date Value Ref Range Status   01/19/2024 7.7 6.0 - 8.4 g/dL Final     Albumin   Date Value Ref Range Status   01/19/2024 4.2 3.5 - 5.2 g/dL Final     Total Bilirubin   Date Value Ref Range Status   01/19/2024 0.6 0.1 - 1.0 mg/dL Final     Comment:     For infants and newborns, interpretation of results should be based  on gestational age, weight and in agreement with clinical  observations.    Premature Infant recommended reference ranges:  Up to 24  "hours.............<8.0 mg/dL  Up to 48 hours............<12.0 mg/dL  3-5 days..................<15.0 mg/dL  6-29 days.................<15.0 mg/dL       AST   Date Value Ref Range Status   01/19/2024 33 10 - 40 U/L Final     Alkaline Phosphatase   Date Value Ref Range Status   01/19/2024 56 55 - 135 U/L Final     ALT   Date Value Ref Range Status   01/19/2024 17 10 - 44 U/L Final     Coags: No results found for: "INR", "PROTIME", "PTT"  FLP:      Lab Results   Component Value Date    CHOL 294 (H) 01/19/2024    CHOL 276 (H) 06/04/2020    CHOL 282 (H) 02/12/2019     Lab Results   Component Value Date     (H) 01/19/2024    HDL >140 (H) 06/04/2020     (H) 02/12/2019     Lab Results   Component Value Date    LDLCALC 147.4 01/19/2024    LDLCALC Unable to calculate 06/04/2020    LDLCALC 138.4 02/12/2019     Lab Results   Component Value Date    TRIG 58 01/19/2024    TRIG 44 06/04/2020    TRIG 48 02/12/2019     Lab Results   Component Value Date    CHOLHDL 45.9 01/19/2024    CHOLHDL Unable to calculate 06/04/2020    CHOLHDL 47.5 02/12/2019      DM:      LDL Cholesterol   Date Value Ref Range Status   01/19/2024 147.4 63.0 - 159.0 mg/dL Final     Comment:     The National Cholesterol Education Program (NCEP) has set the  following guidelines (reference values) for LDL Cholesterol:  Optimal.......................<130 mg/dL  Borderline High...............130-159 mg/dL  High..........................160-189 mg/dL  Very High.....................>190 mg/dL       Creatinine   Date Value Ref Range Status   01/19/2024 0.9 0.5 - 1.4 mg/dL Final     Thyroid:   TSH   Date Value Ref Range Status   01/19/2024 0.891 0.400 - 4.000 uIU/mL Final     Anemia: No results found for: "IRON", "TIBC", "FERRITIN", "[id number]", "FOLATE"  Cardiac: No results found for: "TROPONINI", "CKTOTAL", "CKMB", "BNP"  Urinalysis:   Color, UA   Date Value Ref Range Status   01/19/2024 Yellow Yellow, Straw, Lashae Final     Specific Gravity, UA   Date " Value Ref Range Status   01/19/2024 1.020 1.005 - 1.030 Final     Nitrite, UA   Date Value Ref Range Status   01/19/2024 Negative Negative Final     Ketones, UA   Date Value Ref Range Status   01/19/2024 Negative Negative Final       Other Results:  EKG (my interpretation):     Radiology:  Mammo Digital Screening Bilat w/ Marcellus  Narrative: Facility:  [facility] [address] [place] [id number] [phone]    Name: Stephanie Perez    MRN: [id number]    Result:  Mammo Digital Screening Bilat w/ Marcellus    History:  Patient is 74 y.o. and is seen for a screening mammogram.    Films Compared:  Prior images (if available) were compared.     Findings:  This procedure was performed using tomosynthesis.   Computer-aided detection was utilized in the interpretation of this   examination.    The breasts are heterogeneously dense, which may obscure small masses.   There is no evidence of suspicious masses, microcalcifications or   architectural distortion.  Impression:    No mammographic evidence of malignancy.    BI-RADS Category 1: Negative    Recommendation:  Routine screening mammogram in 1 year is recommended.    Your estimated lifetime risk of breast cancer (to age 85) based on   Tyrer-Cuzick risk assessment model is 6.49%.  According to the American   Cancer Society, patients with a lifetime breast cancer risk of 20% or   higher might benefit from supplemental screening tests, such as screening   breast MRI.    Shorty Waller MD          Assessment/Plan     Stephanie Perez is a 74 y.o. female with:  1. Acute diarrhea  Assessment & Plan:  Check stool studies and electrolyges  Start Align 4mg daily   Eat yogurt   Keep appt with Dr. Garcia   Drink Pedialyte after each stool     Orders:  -     H. pylori antigen, stool; Future; Expected date: 12/04/2024  -     Pancreatic elastase, fecal; Future; Expected date: 12/04/2024  -     Fecal fat, qualitative; Future; Expected date: 12/04/2024  -      Occult blood x 1, stool; Future; Expected date: 12/04/2024  -     WBC, Stool; Future; Expected date: 12/04/2024  -     Rotavirus antigen, stool; Future; Expected date: 12/04/2024  -     Adenovirus Molecular Detection, PCR, Non-Blood Stool; Future; Expected date: 12/04/2024  -     Calprotectin, Stool; Future; Expected date: 12/04/2024  -     Giardia / Cryptosporidum, EIA; Future; Expected date: 12/04/2024  -     Stool Exam-Ova,Cysts,Parasites; Future; Expected date: 12/04/2024  -     Clostridium difficile EIA; Future; Expected date: 12/04/2024  -     Stool culture; Future; Expected date: 12/04/2024  -     COMPREHENSIVE METABOLIC PANEL; Future; Expected date: 12/04/2024  -     Magnesium; Future; Expected date: 12/04/2024  -     CBC W/ AUTO DIFFERENTIAL; Future; Expected date: 12/04/2024    2. Essential hypertension  Assessment & Plan:  controlled on  Losartan/HCTZ 100/12.5 mg daily  -Check Bps at home weekly and prn symptoms for goal BP <130/80.  Avoid [employer] table salt; use Mrs. Quintanilla or Ramon Noyola no salt   -Start healthy diet high in fiber (25-35 gm daily), low fat dairy, lean protein, low in saturated/trans fat (minimize cheese, creamy salad dressings); high in calcium/magnesium/potassium; 1.5 gm sodium diet; low in processed sugars and foods, ie  WHITE sugars, bread, pasta, rice, ice cream, cookies, cake, doughnuts  -Drink 6-8 glasses of water daily  -Moderate exercise 30 min 5 times per week or 75 min intense exercise biweekly   -Start 8-10 hour intermittent fasting diet   -Avoid eating 3 hours prior to bedtime  -Reduce alcohol to 1-2 glasses per day  -Avoid smoking, vaping, stimulant drugs/mediations ie illicit drugs, pseudo-ephedrine  -Use ADD/ADHD meds sparingly  -Minimize NSAIDs          Other orders  -     Bifidobacterium infantis (ALIGN) 4 mg Cap; Take 1 capsule (4 mg total) by mouth once daily.  Dispense: 30 capsule; Refill: 5             I spent a total of 32 minutes on the day of the visit.This  includes face to face time and non-face to face time preparing to see the patient (eg, review of tests), obtaining and/or reviewing separately obtained history, documenting clinical information in the electronic or other health record, independently interpreting results and communicating results to the patient/family/caregiver, or care coordinator.   Code Status:     Vanessa Sauceda MD

## 2024-12-04 NOTE — PATIENT INSTRUCTIONS
Acute diarrhea  Assessment & Plan:  Check stool studies   Start Align 4mg daily   Eat yogurt   Keep appt with Dr. Garcia   Drink Pedialyte after each stool     Orders:  -     H. pylori antigen, stool; Future; Expected date: 12/04/2024  -     Pancreatic elastase, fecal; Future; Expected date: 12/04/2024  -     Fecal fat, qualitative; Future; Expected date: 12/04/2024  -     Occult blood x 1, stool; Future; Expected date: 12/04/2024  -     WBC, Stool; Future; Expected date: 12/04/2024  -     Rotavirus antigen, stool; Future; Expected date: 12/04/2024  -     Adenovirus Molecular Detection, PCR, Non-Blood Stool; Future; Expected date: 12/04/2024  -     Calprotectin, Stool; Future; Expected date: 12/04/2024  -     Giardia / Cryptosporidum, EIA; Future; Expected date: 12/04/2024  -     Stool Exam-Ova,Cysts,Parasites; Future; Expected date: 12/04/2024  -     Clostridium difficile EIA; Future; Expected date: 12/04/2024  -     Stool culture; Future; Expected date: 12/04/2024  -     COMPREHENSIVE METABOLIC PANEL; Future; Expected date: 12/04/2024  -     Magnesium; Future; Expected date: 12/04/2024  -     CBC W/ AUTO DIFFERENTIAL; Future; Expected date: 12/04/2024    Other orders  -     Bifidobacterium infantis (ALIGN) 4 mg Cap; Take 1 capsule (4 mg total) by mouth once daily.  Dispense: 30 capsule; Refill: 5

## 2024-12-05 ENCOUNTER — LAB VISIT (OUTPATIENT)
Dept: LAB | Facility: HOSPITAL | Age: 74
End: 2024-12-05
Attending: INTERNAL MEDICINE
Payer: MEDICARE

## 2024-12-05 DIAGNOSIS — R19.7 ACUTE DIARRHEA: ICD-10-CM

## 2024-12-05 LAB
C DIFF GDH STL QL: NEGATIVE
C DIFF TOX A+B STL QL IA: NEGATIVE
OB PNL STL: NEGATIVE
WBC #/AREA STL HPF: NORMAL /[HPF]

## 2024-12-05 PROCEDURE — 89055 LEUKOCYTE ASSESSMENT FECAL: CPT | Performed by: INTERNAL MEDICINE

## 2024-12-05 PROCEDURE — 87425 ROTAVIRUS AG IA: CPT | Performed by: INTERNAL MEDICINE

## 2024-12-05 PROCEDURE — 82653 EL-1 FECAL QUANTITATIVE: CPT | Performed by: INTERNAL MEDICINE

## 2024-12-05 PROCEDURE — 83993 ASSAY FOR CALPROTECTIN FECAL: CPT | Performed by: INTERNAL MEDICINE

## 2024-12-05 PROCEDURE — 82272 OCCULT BLD FECES 1-3 TESTS: CPT | Performed by: INTERNAL MEDICINE

## 2024-12-05 PROCEDURE — 87449 NOS EACH ORGANISM AG IA: CPT | Performed by: INTERNAL MEDICINE

## 2024-12-05 PROCEDURE — 87046 STOOL CULTR AEROBIC BACT EA: CPT | Performed by: INTERNAL MEDICINE

## 2024-12-05 PROCEDURE — 87449 NOS EACH ORGANISM AG IA: CPT | Mod: 91 | Performed by: INTERNAL MEDICINE

## 2024-12-05 PROCEDURE — 87329 GIARDIA AG IA: CPT | Performed by: INTERNAL MEDICINE

## 2024-12-05 PROCEDURE — 87427 SHIGA-LIKE TOXIN AG IA: CPT | Mod: 59 | Performed by: INTERNAL MEDICINE

## 2024-12-05 PROCEDURE — 87338 HPYLORI STOOL AG IA: CPT | Performed by: INTERNAL MEDICINE

## 2024-12-05 PROCEDURE — 82705 FATS/LIPIDS FECES QUAL: CPT | Performed by: INTERNAL MEDICINE

## 2024-12-05 PROCEDURE — 87045 FECES CULTURE AEROBIC BACT: CPT | Performed by: INTERNAL MEDICINE

## 2024-12-06 LAB
CRYPTOSP AG STL QL IA: NEGATIVE
E COLI SXT1 STL QL IA: NEGATIVE
E COLI SXT2 STL QL IA: NEGATIVE
G LAMBLIA AG STL QL IA: NEGATIVE
RV AG STL QL IA.RAPID: NEGATIVE

## 2024-12-07 LAB — BACTERIA STL CULT: NORMAL

## 2024-12-09 ENCOUNTER — PATIENT MESSAGE (OUTPATIENT)
Dept: PRIMARY CARE CLINIC | Facility: CLINIC | Age: 74
End: 2024-12-09
Payer: MEDICARE

## 2024-12-09 LAB
CALPROTECTIN STL-MCNT: 55.1 MCG/G
FAT STL QL: NORMAL
HADV DNA SERPL QL NAA+PROBE: NEGATIVE
NEUTRAL FAT STL QL: NORMAL
SPECIMEN SOURCE: NORMAL

## 2024-12-10 LAB
ELASTASE 1, FECAL: >500 MCG/G
H PYLORI AG STL QL IA: NOT DETECTED

## 2024-12-11 LAB — O+P STL MICRO: NORMAL

## 2024-12-12 LAB — CRC RECOMMENDATION EXT: NORMAL

## 2024-12-17 ENCOUNTER — TELEPHONE (OUTPATIENT)
Dept: PRIMARY CARE CLINIC | Facility: CLINIC | Age: 74
End: 2024-12-17
Payer: MEDICARE

## 2025-02-25 NOTE — PROGRESS NOTES
[facility] Progress Note      Chief Complaint     Annual Exam       Subjective:      History of Present Illness:  Stephanie Perez is a 74 y.o. female here for annual PE    Lymphocytic microscopic colitis by bx Dr. Isaac Garcia in 2024:  completing 8 wks of budenoside 9mg daily from [facility] ; diarrhea improving to 3 soft stools without mucous, blood without fecal incontinence, further weight loss    HTN dx'ed in 2021  -controlled on  Losartan/HCTZ 100/12.5 mg daily for better BP control  - goes to gym bi-weekly and lifts weights; rides bike at home   -Has been checking Bps at home usually 120/70s.  Active, cuts her own grass; does all ADLs independently; denies SOB, MOTTA, CP  -takes OTC Mag and K  -weight is stable     OA hands: takes chondroitin sulfate 2 tabs daily but still painful with difficulty opening lids by Dr. 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    Past Medical History:  Past Medical History:   Diagnosis Date    Acute diarrhea 2024    Essential hypertension 2019    Hyperlipidemia 2020    Labile blood pressure 2019       Past Surgical History:  No past surgical history on file.    Allergies:  Review of patient's allergies indicates:  No Known Allergies    Home Medications:  Current Outpatient Medications   Medication Sig Dispense  Refill    budesonide (ENTOCORT EC) 3 mg capsule Take 9 mg by mouth.      chondroitin sulfate A (CHONDROITIN SULFATE ORAL) Take by mouth.      losartan-hydrochlorothiazide 100-12.5 mg (HYZAAR) 100-12.5 mg Tab Take 1 tablet by mouth once daily. 90 tablet 3    magnesium oxide (MAG-OX) 400 mg (241.3 mg magnesium) tablet Take 400 mg by mouth once daily.      multivitamin (THERAGRAN) per tablet Take 1 tablet by mouth once daily.      omega-3 fatty acids/fish oil (FISH OIL-OMEGA-3 FATTY ACIDS) 300-1,000 mg capsule Take 2 capsules by mouth once daily.      sumatriptan (IMITREX) 50 MG tablet Take 1/2 to 1 tab at onset of headache.  If no improvement in 2 hours, take another.  Do not take more than 2 in 24 hours. 9 tablet 11    turmeric, bulk, 95 % Powd       vitamin E, dl,tocopheryl acet, (VITAMIN E, DL, ACETATE,) 180 mg (400 unit) Cap        No current facility-administered medications for this visit.        Family History:  Family History   Problem Relation Name Age of Onset    Breast cancer Mother          dcis    Breast cancer Sister  50        dcis    Breast cancer Paternal Grandmother      Breast cancer Sister  63       Social History:  Social History     Tobacco Use    Smoking status: Former    Smokeless tobacco: Never   Substance Use Topics    Alcohol use: Not Currently    Drug use: Never       Review of Systems:  Pertinent positives and negatives listed in HPI. All other systems are reviewed and are negative.    Health Maintaince :   Health Maintenance Topics with due status: Not Due       Topic Last Completion Date    DEXA Scan 10/16/2023    Mammogram 11/22/2024    Colorectal Cancer Screening 12/12/2024    Lipid Panel 02/26/2025           Eye: needs has corrective lenses  Dental: UTD Feb 2025     Immunizations:   Tdap: n/a.  Influenza: refuses.  Pneumovax: UTD  Shingrex x 2: needs  COVID: refuses  Hepatitis C:   Cancer Screening:  PAP:   Mammogram: UTD 11/2024  Colonoscopy:  12/2024 by Dr. Isaac Garcia no  "polyps   DEXA:  10/2023 normal   LDCT: n/a    The ASCVD Risk score (Quincy DK, et al., 2019) failed to calculate for the following reasons:    The valid HDL cholesterol range is 20 to 100 mg/dL      Objective:   /80 (BP Location: Left arm, Patient Position: Sitting)   Pulse 64   Ht 5' 9" (1.753 m)   Wt 54.8 kg (120 lb 13 oz)   LMP 01/01/1980 Comment: tubal 1980  SpO2 99%   BMI 17.84 kg/m²      Body mass index is 17.84 kg/m².       Physical Examination:  General: Alert and awake in no apparent distress  HEENT: Normocephalic and atraumatic; Tms WNL  Eyes:  PERRL; EOMi with anicteric sclera and clear conjunctivae  Mouth:  Oropharynx clear and without exudate; moist mucous membranes  Neck:   Cervical nodes not enlarged; supple; no bruits  Cardio:  Regular rate and rhythm with normal S1 and S2; no murmurs or rubs  Resp:  CTAB; respirations unlabored; no wheezes, crackles or rhonchi  Abdom: Soft, NTND with normoactive bowel sounds; negative HSM  Extrem: Warm and well-perfused with no clubbing, cyanosis or edema; heberdon and jaquan nodes present   Skin:  No rashes, lesions, or color changes  Pulses:  2+ and symmetric distally  Neuro:  AAOx3; cooperative and pleasant with no focal deficits    Laboratory:      Most Recent Data:  Lab Results   Component Value Date    WBC 5.73 02/26/2025    HGB 12.5 02/26/2025    HCT 39.0 02/26/2025     02/26/2025    CHOL 276 (H) 02/26/2025    TRIG 58 02/26/2025    HDL >140 (H) 02/26/2025    ALT 18 02/26/2025    AST 50 (H) 02/26/2025     02/26/2025    K 5.0 02/26/2025     02/26/2025    BUN 19 02/26/2025    CO2 27 02/26/2025    TSH 0.556 02/26/2025              CBC:   WBC   Date Value Ref Range Status   02/26/2025 5.73 3.90 - 12.70 K/uL Final     Hemoglobin   Date Value Ref Range Status   02/26/2025 12.5 12.0 - 16.0 g/dL Final     Hematocrit   Date Value Ref Range Status   02/26/2025 39.0 37.0 - 48.5 % Final     Platelets   Date Value Ref Range Status " "  02/26/2025 243 150 - 450 K/uL Final     MCV   Date Value Ref Range Status   02/26/2025 102 (H) 82 - 98 fL Final     RDW   Date Value Ref Range Status   02/26/2025 13.0 11.5 - 14.5 % Final     BMP:   Sodium   Date Value Ref Range Status   02/26/2025 141 136 - 145 mmol/L Final     Potassium   Date Value Ref Range Status   02/26/2025 5.0 3.5 - 5.1 mmol/L Final     Chloride   Date Value Ref Range Status   02/26/2025 105 95 - 110 mmol/L Final     CO2   Date Value Ref Range Status   02/26/2025 27 23 - 29 mmol/L Final     BUN   Date Value Ref Range Status   02/26/2025 19 8 - 23 mg/dL Final     Creatinine   Date Value Ref Range Status   02/26/2025 1.0 0.5 - 1.4 mg/dL Final     Glucose   Date Value Ref Range Status   02/26/2025 98 70 - 110 mg/dL Final     Calcium   Date Value Ref Range Status   02/26/2025 10.1 8.7 - 10.5 mg/dL Final     Magnesium   Date Value Ref Range Status   02/26/2025 1.9 1.6 - 2.6 mg/dL Final     LFTs:   Total Protein   Date Value Ref Range Status   02/26/2025 7.6 6.0 - 8.4 g/dL Final     Albumin   Date Value Ref Range Status   02/26/2025 3.9 3.5 - 5.2 g/dL Final     Total Bilirubin   Date Value Ref Range Status   02/26/2025 0.7 0.1 - 1.0 mg/dL Final     Comment:     For infants and newborns, interpretation of results should be based  on gestational age, weight and in agreement with clinical  observations.    Premature Infant recommended reference ranges:  Up to 24 hours.............<8.0 mg/dL  Up to 48 hours............<12.0 mg/dL  3-5 days..................<15.0 mg/dL  6-29 days.................<15.0 mg/dL       AST   Date Value Ref Range Status   02/26/2025 50 (H) 10 - 40 U/L Final     Alkaline Phosphatase   Date Value Ref Range Status   02/26/2025 62 40 - 150 U/L Final     ALT   Date Value Ref Range Status   02/26/2025 18 10 - 44 U/L Final     Coags: No results found for: "INR", "PROTIME", "PTT"  FLP:      Lab Results   Component Value Date    CHOL 276 (H) 02/26/2025    CHOL 294 (H) 01/19/2024    " "CHOL 276 (H) 06/04/2020     Lab Results   Component Value Date    HDL >140 (H) 02/26/2025     (H) 01/19/2024    HDL >140 (H) 06/04/2020     Lab Results   Component Value Date    LDLCALC Unable to calculate 02/26/2025    LDLCALC 147.4 01/19/2024    LDLCALC Unable to calculate 06/04/2020     Lab Results   Component Value Date    TRIG 58 02/26/2025    TRIG 58 01/19/2024    TRIG 44 06/04/2020     Lab Results   Component Value Date    CHOLHDL Unable to calculate 02/26/2025    CHOLHDL 45.9 01/19/2024    CHOLHDL Unable to calculate 06/04/2020      DM:      LDL Cholesterol   Date Value Ref Range Status   02/26/2025 Unable to calculate 63.0 - 159.0 mg/dL Final     Comment:     The National Cholesterol Education Program (NCEP) has set the  following guidelines (reference values) for LDL Cholesterol:  Optimal.......................<130 mg/dL  Borderline High...............130-159 mg/dL  High..........................160-189 mg/dL  Very High.....................>190 mg/dL       Creatinine   Date Value Ref Range Status   02/26/2025 1.0 0.5 - 1.4 mg/dL Final     Thyroid:   TSH   Date Value Ref Range Status   02/26/2025 0.556 0.400 - 4.000 uIU/mL Final     Anemia: No results found for: "IRON", "TIBC", "FERRITIN", "[id number]", "FOLATE"  Cardiac: No results found for: "TROPONINI", "CKTOTAL", "CKMB", "BNP"  Urinalysis:   Color, UA   Date Value Ref Range Status   02/26/2025 Yellow Yellow, Straw, Lashae Final     Specific Gravity, UA   Date Value Ref Range Status   02/26/2025 1.020 1.005 - 1.030 Final     Nitrite, UA   Date Value Ref Range Status   02/26/2025 Negative Negative Final     Ketones, UA   Date Value Ref Range Status   02/26/2025 Negative Negative Final       Other Results:  EKG (my interpretation):     Radiology:  Mammo Digital Screening Bilat w/ Marcellus  Narrative: Facility:  [facility] [address] [place] [id number] [phone]    Name: Stephanie Perez    MRN: " [id number]    Result:  Mammo Digital Screening Bilat w/ Marcellus    History:  Patient is 74 y.o. and is seen for a screening mammogram.    Films Compared:  Prior images (if available) were compared.     Findings:  This procedure was performed using tomosynthesis.   Computer-aided detection was utilized in the interpretation of this   examination.    The breasts are heterogeneously dense, which may obscure small masses.   There is no evidence of suspicious masses, microcalcifications or   architectural distortion.  Impression:    No mammographic evidence of malignancy.    BI-RADS Category 1: Negative    Recommendation:  Routine screening mammogram in 1 year is recommended.    Your estimated lifetime risk of breast cancer (to age 85) based on   Tyrer-Cuzick risk assessment model is 6.49%.  According to the American   Cancer Society, patients with a lifetime breast cancer risk of 20% or   higher might benefit from supplemental screening tests, such as screening   breast MRI.    Shorty Waller MD          Assessment/Plan     Stephanie Perez is a 74 y.o. female with:  1. Preventative health care  Assessment & Plan:  Labs today   Make eye appt   Refuses flu and Shingrex vaccines     Orders:  -     Urinalysis; Future; Expected date: 02/26/2025  -     Comprehensive Metabolic Panel; Future; Expected date: 02/26/2025  -     CBC Auto Differential; Future; Expected date: 02/26/2025    2. Primary osteoarthritis of both hands  Assessment & Plan:  Cont chondoitin sulfate 2 tabs daily as per Dr. Esther Dunham  Can take Tylenol  bid prn      3. Migraine with aura and without status migrainosus, not intractable  Overview:  neurologistEsther    Assessment & Plan:  Cont prn Imitrex 50mg but avoid triggers ie heat with exertion      4. Essential hypertension  Assessment & Plan:  controlled on  Losartan/HCTZ 100/12.5 mg daily  -Check Bps at home weekly and prn symptoms for goal BP <130/80.  Avoid Pierce's table salt; use Mrs.  Dash or Ramon Cachere no salt   -Start healthy diet high in fiber (25-35 gm daily), low fat dairy, lean protein, low in saturated/trans fat (minimize cheese, creamy salad dressings); high in calcium/magnesium/potassium; 1.5 gm sodium diet; low in processed sugars and foods, ie  WHITE sugars, bread, pasta, rice, ice cream, cookies, cake, doughnuts  -Drink 6-8 glasses of water daily  -Moderate exercise 30 min 5 times per week or 75 min intense exercise biweekly   -Start 8-10 hour intermittent fasting diet   -Avoid eating 3 hours prior to bedtime  -Reduce alcohol to 1-2 glasses per day  -Avoid smoking, vaping, stimulant drugs/mediations ie illicit drugs, pseudo-ephedrine  -Use ADD/ADHD meds sparingly  -Minimize NSAIDs        Orders:  -     Magnesium; Future; Expected date: 02/26/2025  -     Microalbumin/Creatinine Ratio, Urine; Future; Expected date: 02/26/2025  -     TSH; Future; Expected date: 02/26/2025    5. Vitamin D deficiency  Assessment & Plan:  Check Vit D level today    Orders:  -     Vitamin D; Future; Expected date: 02/26/2025    6. Lymphocytic colitis  Overview:  Dx'ed by bx by Dr. Isaac Garcia in 12/2024    Assessment & Plan:  Complete 8 wks of of Budesonide 9mg daily and then prn from [facility]       7. Mixed hyperlipidemia  -     Lipid Panel; Future; Expected date: 02/26/2025  -     CBC Auto Differential; Future; Expected date: 02/26/2025    8. Raynaud's disease without gangrene  Assessment & Plan:  Wear gloves to keep hands warm                 I spent a total of 32 minutes on the day of the visit.This includes face to face time and non-face to face time preparing to see the patient (eg, review of tests), obtaining and/or reviewing separately obtained history, documenting clinical information in the electronic or other health record, independently interpreting results and communicating results to the patient/family/caregiver, or care coordinator.   Code Status:     Vanessa Sauceda MD

## 2025-02-26 ENCOUNTER — LAB VISIT (OUTPATIENT)
Dept: LAB | Facility: HOSPITAL | Age: 75
End: 2025-02-26
Attending: INTERNAL MEDICINE
Payer: MEDICARE

## 2025-02-26 ENCOUNTER — OFFICE VISIT (OUTPATIENT)
Dept: PRIMARY CARE CLINIC | Facility: CLINIC | Age: 75
End: 2025-02-26
Payer: MEDICARE

## 2025-02-26 ENCOUNTER — TELEPHONE (OUTPATIENT)
Dept: PRIMARY CARE CLINIC | Facility: CLINIC | Age: 75
End: 2025-02-26

## 2025-02-26 ENCOUNTER — RESULTS FOLLOW-UP (OUTPATIENT)
Dept: PRIMARY CARE CLINIC | Facility: CLINIC | Age: 75
End: 2025-02-26

## 2025-02-26 VITALS
BODY MASS INDEX: 17.89 KG/M2 | HEART RATE: 64 BPM | OXYGEN SATURATION: 99 % | DIASTOLIC BLOOD PRESSURE: 80 MMHG | HEIGHT: 69 IN | SYSTOLIC BLOOD PRESSURE: 124 MMHG | WEIGHT: 120.81 LBS

## 2025-02-26 DIAGNOSIS — Z00.00 PREVENTATIVE HEALTH CARE: Primary | ICD-10-CM

## 2025-02-26 DIAGNOSIS — E78.2 MIXED HYPERLIPIDEMIA: ICD-10-CM

## 2025-02-26 DIAGNOSIS — E55.9 VITAMIN D DEFICIENCY: ICD-10-CM

## 2025-02-26 DIAGNOSIS — M19.042 PRIMARY OSTEOARTHRITIS OF BOTH HANDS: ICD-10-CM

## 2025-02-26 DIAGNOSIS — I10 ESSENTIAL HYPERTENSION: ICD-10-CM

## 2025-02-26 DIAGNOSIS — Z00.00 PREVENTATIVE HEALTH CARE: ICD-10-CM

## 2025-02-26 DIAGNOSIS — I73.00 RAYNAUD'S DISEASE WITHOUT GANGRENE: ICD-10-CM

## 2025-02-26 DIAGNOSIS — K52.832 LYMPHOCYTIC COLITIS: ICD-10-CM

## 2025-02-26 DIAGNOSIS — M19.041 PRIMARY OSTEOARTHRITIS OF BOTH HANDS: ICD-10-CM

## 2025-02-26 DIAGNOSIS — G43.109 MIGRAINE WITH AURA AND WITHOUT STATUS MIGRAINOSUS, NOT INTRACTABLE: ICD-10-CM

## 2025-02-26 PROBLEM — R19.7 ACUTE DIARRHEA: Status: RESOLVED | Noted: 2024-12-04 | Resolved: 2025-02-26

## 2025-02-26 LAB
25(OH)D3+25(OH)D2 SERPL-MCNC: 55 NG/ML (ref 30–96)
ALBUMIN SERPL BCP-MCNC: 3.9 G/DL (ref 3.5–5.2)
ALBUMIN/CREAT UR: NORMAL UG/MG (ref 0–30)
ALP SERPL-CCNC: 62 U/L (ref 40–150)
ALT SERPL W/O P-5'-P-CCNC: 18 U/L (ref 10–44)
ANION GAP SERPL CALC-SCNC: 9 MMOL/L (ref 8–16)
AST SERPL-CCNC: 50 U/L (ref 10–40)
BASOPHILS # BLD AUTO: 0.04 K/UL (ref 0–0.2)
BASOPHILS NFR BLD: 0.7 % (ref 0–1.9)
BILIRUB SERPL-MCNC: 0.7 MG/DL (ref 0.1–1)
BILIRUB UR QL STRIP: NEGATIVE
BUN SERPL-MCNC: 19 MG/DL (ref 8–23)
CALCIUM SERPL-MCNC: 10.1 MG/DL (ref 8.7–10.5)
CHLORIDE SERPL-SCNC: 105 MMOL/L (ref 95–110)
CHOLEST SERPL-MCNC: 276 MG/DL (ref 120–199)
CHOLEST/HDLC SERPL: ABNORMAL {RATIO} (ref 2–5)
CLARITY UR REFRACT.AUTO: CLEAR
CO2 SERPL-SCNC: 27 MMOL/L (ref 23–29)
COLOR UR AUTO: YELLOW
CREAT SERPL-MCNC: 1 MG/DL (ref 0.5–1.4)
CREAT UR-MCNC: 96 MG/DL (ref 15–325)
DIFFERENTIAL METHOD BLD: ABNORMAL
EOSINOPHIL # BLD AUTO: 0.1 K/UL (ref 0–0.5)
EOSINOPHIL NFR BLD: 1.2 % (ref 0–8)
ERYTHROCYTE [DISTWIDTH] IN BLOOD BY AUTOMATED COUNT: 13 % (ref 11.5–14.5)
EST. GFR  (NO RACE VARIABLE): 59.1 ML/MIN/1.73 M^2
GLUCOSE SERPL-MCNC: 98 MG/DL (ref 70–110)
GLUCOSE UR QL STRIP: NEGATIVE
HCT VFR BLD AUTO: 39 % (ref 37–48.5)
HDLC SERPL-MCNC: >140 MG/DL (ref 40–75)
HDLC SERPL: ABNORMAL % (ref 20–50)
HGB BLD-MCNC: 12.5 G/DL (ref 12–16)
HGB UR QL STRIP: NEGATIVE
IMM GRANULOCYTES # BLD AUTO: 0.01 K/UL (ref 0–0.04)
IMM GRANULOCYTES NFR BLD AUTO: 0.2 % (ref 0–0.5)
KETONES UR QL STRIP: NEGATIVE
LDLC SERPL CALC-MCNC: ABNORMAL MG/DL (ref 63–159)
LEUKOCYTE ESTERASE UR QL STRIP: NEGATIVE
LYMPHOCYTES # BLD AUTO: 1.3 K/UL (ref 1–4.8)
LYMPHOCYTES NFR BLD: 23.4 % (ref 18–48)
MAGNESIUM SERPL-MCNC: 1.9 MG/DL (ref 1.6–2.6)
MCH RBC QN AUTO: 32.7 PG (ref 27–31)
MCHC RBC AUTO-ENTMCNC: 32.1 G/DL (ref 32–36)
MCV RBC AUTO: 102 FL (ref 82–98)
MICROALBUMIN UR DL<=1MG/L-MCNC: <5 UG/ML
MONOCYTES # BLD AUTO: 0.4 K/UL (ref 0.3–1)
MONOCYTES NFR BLD: 7.2 % (ref 4–15)
NEUTROPHILS # BLD AUTO: 3.9 K/UL (ref 1.8–7.7)
NEUTROPHILS NFR BLD: 67.3 % (ref 38–73)
NITRITE UR QL STRIP: NEGATIVE
NONHDLC SERPL-MCNC: ABNORMAL MG/DL
NRBC BLD-RTO: 0 /100 WBC
PH UR STRIP: 8 [PH] (ref 5–8)
PLATELET # BLD AUTO: 243 K/UL (ref 150–450)
PMV BLD AUTO: 10.8 FL (ref 9.2–12.9)
POTASSIUM SERPL-SCNC: 5 MMOL/L (ref 3.5–5.1)
PROT SERPL-MCNC: 7.6 G/DL (ref 6–8.4)
PROT UR QL STRIP: NEGATIVE
RBC # BLD AUTO: 3.82 M/UL (ref 4–5.4)
SODIUM SERPL-SCNC: 141 MMOL/L (ref 136–145)
SP GR UR STRIP: 1.02 (ref 1–1.03)
TRIGL SERPL-MCNC: 58 MG/DL (ref 30–150)
TSH SERPL DL<=0.005 MIU/L-ACNC: 0.56 UIU/ML (ref 0.4–4)
URN SPEC COLLECT METH UR: NORMAL
WBC # BLD AUTO: 5.73 K/UL (ref 3.9–12.7)

## 2025-02-26 PROCEDURE — 81003 URINALYSIS AUTO W/O SCOPE: CPT | Performed by: INTERNAL MEDICINE

## 2025-02-26 PROCEDURE — 80053 COMPREHEN METABOLIC PANEL: CPT | Performed by: INTERNAL MEDICINE

## 2025-02-26 PROCEDURE — 85025 COMPLETE CBC W/AUTO DIFF WBC: CPT | Performed by: INTERNAL MEDICINE

## 2025-02-26 PROCEDURE — 99214 OFFICE O/P EST MOD 30 MIN: CPT | Mod: PBBFAC,PN | Performed by: INTERNAL MEDICINE

## 2025-02-26 PROCEDURE — 82306 VITAMIN D 25 HYDROXY: CPT | Performed by: INTERNAL MEDICINE

## 2025-02-26 PROCEDURE — 36415 COLL VENOUS BLD VENIPUNCTURE: CPT | Mod: PN | Performed by: INTERNAL MEDICINE

## 2025-02-26 PROCEDURE — 84443 ASSAY THYROID STIM HORMONE: CPT | Performed by: INTERNAL MEDICINE

## 2025-02-26 PROCEDURE — 82570 ASSAY OF URINE CREATININE: CPT | Performed by: INTERNAL MEDICINE

## 2025-02-26 PROCEDURE — 99999 PR PBB SHADOW E&M-EST. PATIENT-LVL IV: CPT | Mod: PBBFAC,,, | Performed by: INTERNAL MEDICINE

## 2025-02-26 PROCEDURE — 80061 LIPID PANEL: CPT | Performed by: INTERNAL MEDICINE

## 2025-02-26 PROCEDURE — 83735 ASSAY OF MAGNESIUM: CPT | Performed by: INTERNAL MEDICINE

## 2025-02-26 RX ORDER — BUDESONIDE 3 MG/1
9 CAPSULE, COATED PELLETS ORAL
COMMUNITY

## 2025-02-26 RX ORDER — SODIUM PICOSULFATE, MAGNESIUM OXIDE, AND ANHYDROUS CITRIC ACID 12; 3.5; 1 G/175ML; G/175ML; MG/175ML
LIQUID ORAL
COMMUNITY
Start: 2024-12-10 | End: 2025-02-26

## 2025-02-26 RX ORDER — VIT C/E/ZN/COPPR/LUTEIN/ZEAXAN 250MG-90MG
CAPSULE ORAL
COMMUNITY

## 2025-02-26 NOTE — PATIENT INSTRUCTIONS
Preventative health care  Assessment & Plan:  Labs today   Make eye appt       Primary osteoarthritis of both hands  Assessment & Plan:  Consider osteo-biflex twice per day  Can take Tylenol  bid prn      Migraine with aura and without status migrainosus, not intractable  Assessment & Plan:  Cont prn Imitrex 50mg but avoid triggers ie heat with exertion      Essential hypertension  Assessment & Plan:  controlled on  Losartan/HCTZ 100/12.5 mg daily  -Check Bps at home weekly and prn symptoms for goal BP <130/80.  Avoid Pierce's table salt; use Mrs. Quintanilla or Ramon Noyola no salt   -Start healthy diet high in fiber (25-35 gm daily), low fat dairy, lean protein, low in saturated/trans fat (minimize cheese, creamy salad dressings); high in calcium/magnesium/potassium; 1.5 gm sodium diet; low in processed sugars and foods, ie  WHITE sugars, bread, pasta, rice, ice cream, cookies, cake, doughnuts  -Drink 6-8 glasses of water daily  -Moderate exercise 30 min 5 times per week or 75 min intense exercise biweekly   -Start 8-10 hour intermittent fasting diet   -Avoid eating 3 hours prior to bedtime  -Reduce alcohol to 1-2 glasses per day  -Avoid smoking, vaping, stimulant drugs/mediations ie illicit drugs, pseudo-ephedrine  -Use ADD/ADHD meds sparingly  -Minimize NSAIDs          Vitamin D deficiency  Assessment & Plan:  Check Vit D level today      Lymphocytic colitis  Assessment & Plan:  Complete 8 wks of of Budesonide 9mg daily and then prn from [facility]

## 2025-03-03 ENCOUNTER — PATIENT OUTREACH (OUTPATIENT)
Dept: ADMINISTRATIVE | Facility: HOSPITAL | Age: 75
End: 2025-03-03
Payer: MEDICARE

## 2025-03-03 NOTE — LETTER
AUTHORIZATION FOR RELEASE OF   CONFIDENTIAL INFORMATION    Dear [facility],    We are seeing Stephanie Perez, date of birth 1950, in the clinic at [facility]. Vanessa Sauceda MD is the patient's PCP. Stephanie Perez has an outstanding lab/procedure at the time we reviewed her chart. In order to help keep her health information updated, she has authorized us to request the following medical record(s):        (  )  MAMMOGRAM                                      ( x )  COLONOSCOPY      (  )  PAP SMEAR                                          (  )  OUTSIDE LAB RESULTS     (  )  DEXA SCAN                                          (  )  EYE EXAM            (  )  FOOT EXAM                                          (  )  ENTIRE RECORD     (  )  OUTSIDE IMMUNIZATIONS                 (  )  _______________         Please fax records to Vanessa Sauceda MD, [phone]       Patient Name: Stephanie Perez  : 1950  Patient Phone #: [phone]

## 2025-03-07 ENCOUNTER — PATIENT OUTREACH (OUTPATIENT)
Dept: ADMINISTRATIVE | Facility: HOSPITAL | Age: 75
End: 2025-03-07
Payer: MEDICARE

## 2025-03-07 NOTE — PROGRESS NOTES
Called pt. And informed him of the results from his Cscope. Pt. Stated understanding and denies further questions. Put pt.  On 5yr recall list. HM updated with colonoscopy.

## 2025-06-23 ENCOUNTER — PATIENT MESSAGE (OUTPATIENT)
Dept: ADMINISTRATIVE | Facility: OTHER | Age: 75
End: 2025-06-23
Payer: MEDICARE

## 2025-06-23 DIAGNOSIS — Z00.00 ENCOUNTER FOR MEDICARE ANNUAL WELLNESS EXAM: ICD-10-CM

## 2025-08-25 ENCOUNTER — TELEPHONE (OUTPATIENT)
Dept: PRIMARY CARE CLINIC | Facility: CLINIC | Age: 75
End: 2025-08-25
Payer: MEDICARE

## 2025-09-03 DIAGNOSIS — I10 ESSENTIAL HYPERTENSION: ICD-10-CM

## 2025-09-03 RX ORDER — LOSARTAN POTASSIUM AND HYDROCHLOROTHIAZIDE 12.5; 1 MG/1; MG/1
1 TABLET ORAL DAILY
Qty: 90 TABLET | Refills: 1 | Status: SHIPPED | OUTPATIENT
Start: 2025-09-03